# Patient Record
Sex: MALE | Race: WHITE
[De-identification: names, ages, dates, MRNs, and addresses within clinical notes are randomized per-mention and may not be internally consistent; named-entity substitution may affect disease eponyms.]

---

## 2017-01-03 ENCOUNTER — HOSPITAL ENCOUNTER (OUTPATIENT)
Dept: HOSPITAL 61 - PCVCCLINIC | Age: 72
End: 2017-01-03
Attending: INTERNAL MEDICINE
Payer: MEDICARE

## 2017-01-03 DIAGNOSIS — I48.91: Primary | ICD-10-CM

## 2017-01-03 PROCEDURE — 85610 PROTHROMBIN TIME: CPT

## 2017-01-26 ENCOUNTER — HOSPITAL ENCOUNTER (OUTPATIENT)
Dept: HOSPITAL 61 - PCVCCLINIC | Age: 72
Discharge: HOME | End: 2017-01-26
Attending: INTERNAL MEDICINE
Payer: MEDICARE

## 2017-01-26 DIAGNOSIS — I48.91: Primary | ICD-10-CM

## 2017-01-26 PROCEDURE — 85610 PROTHROMBIN TIME: CPT

## 2017-02-15 ENCOUNTER — HOSPITAL ENCOUNTER (OUTPATIENT)
Dept: HOSPITAL 61 - PCVCCLINIC | Age: 72
Discharge: HOME | End: 2017-02-15
Attending: INTERNAL MEDICINE
Payer: MEDICARE

## 2017-02-15 DIAGNOSIS — I48.91: Primary | ICD-10-CM

## 2017-02-15 PROCEDURE — 85610 PROTHROMBIN TIME: CPT

## 2017-03-01 ENCOUNTER — HOSPITAL ENCOUNTER (OUTPATIENT)
Dept: HOSPITAL 61 - PCVCCLINIC | Age: 72
Discharge: HOME | End: 2017-03-01
Attending: INTERNAL MEDICINE
Payer: MEDICARE

## 2017-03-01 DIAGNOSIS — I48.91: Primary | ICD-10-CM

## 2017-03-01 PROCEDURE — 85610 PROTHROMBIN TIME: CPT

## 2017-03-15 ENCOUNTER — HOSPITAL ENCOUNTER (OUTPATIENT)
Dept: HOSPITAL 61 - PCVCCLINIC | Age: 72
Discharge: HOME | End: 2017-03-15
Attending: INTERNAL MEDICINE
Payer: MEDICARE

## 2017-03-15 DIAGNOSIS — I48.91: Primary | ICD-10-CM

## 2017-03-15 PROCEDURE — G0463 HOSPITAL OUTPT CLINIC VISIT: HCPCS

## 2017-03-15 PROCEDURE — 85610 PROTHROMBIN TIME: CPT

## 2017-03-29 ENCOUNTER — HOSPITAL ENCOUNTER (OUTPATIENT)
Dept: HOSPITAL 61 - PCVCCLINIC | Age: 72
Discharge: HOME | End: 2017-03-29
Attending: INTERNAL MEDICINE
Payer: MEDICARE

## 2017-03-29 DIAGNOSIS — I48.91: Primary | ICD-10-CM

## 2017-03-29 DIAGNOSIS — I10: ICD-10-CM

## 2017-03-29 DIAGNOSIS — R00.1: ICD-10-CM

## 2017-03-29 DIAGNOSIS — E78.5: ICD-10-CM

## 2017-03-29 PROCEDURE — 85610 PROTHROMBIN TIME: CPT

## 2017-03-29 PROCEDURE — G0463 HOSPITAL OUTPT CLINIC VISIT: HCPCS

## 2017-04-05 ENCOUNTER — HOSPITAL ENCOUNTER (OUTPATIENT)
Dept: HOSPITAL 61 - PCVCCLINIC | Age: 72
Discharge: HOME | End: 2017-04-05
Attending: INTERNAL MEDICINE
Payer: MEDICARE

## 2017-04-05 DIAGNOSIS — I48.91: Primary | ICD-10-CM

## 2017-04-05 PROCEDURE — 85610 PROTHROMBIN TIME: CPT

## 2017-04-05 PROCEDURE — G0463 HOSPITAL OUTPT CLINIC VISIT: HCPCS

## 2017-04-12 ENCOUNTER — HOSPITAL ENCOUNTER (OUTPATIENT)
Dept: HOSPITAL 61 - PCVCCLINIC | Age: 72
Discharge: HOME | End: 2017-04-12
Attending: INTERNAL MEDICINE
Payer: MEDICARE

## 2017-04-12 DIAGNOSIS — I48.91: Primary | ICD-10-CM

## 2017-04-12 PROCEDURE — G0463 HOSPITAL OUTPT CLINIC VISIT: HCPCS

## 2017-04-12 PROCEDURE — 85610 PROTHROMBIN TIME: CPT

## 2017-04-26 ENCOUNTER — HOSPITAL ENCOUNTER (OUTPATIENT)
Dept: HOSPITAL 61 - PCVCCLINIC | Age: 72
Discharge: HOME | End: 2017-04-26
Attending: INTERNAL MEDICINE
Payer: MEDICARE

## 2017-04-26 DIAGNOSIS — I48.91: Primary | ICD-10-CM

## 2017-04-26 PROCEDURE — 85610 PROTHROMBIN TIME: CPT

## 2017-04-26 PROCEDURE — G0463 HOSPITAL OUTPT CLINIC VISIT: HCPCS

## 2017-05-25 ENCOUNTER — HOSPITAL ENCOUNTER (OUTPATIENT)
Dept: HOSPITAL 61 - PCVCCLINIC | Age: 72
Discharge: HOME | End: 2017-05-25
Attending: INTERNAL MEDICINE
Payer: MEDICARE

## 2017-05-25 DIAGNOSIS — Z79.01: ICD-10-CM

## 2017-05-25 DIAGNOSIS — I48.91: Primary | ICD-10-CM

## 2017-05-25 PROCEDURE — 85610 PROTHROMBIN TIME: CPT

## 2017-06-22 ENCOUNTER — HOSPITAL ENCOUNTER (OUTPATIENT)
Dept: HOSPITAL 61 - PCVCCLINIC | Age: 72
Discharge: HOME | End: 2017-06-22
Attending: INTERNAL MEDICINE
Payer: MEDICARE

## 2017-06-22 DIAGNOSIS — I48.91: Primary | ICD-10-CM

## 2017-06-22 DIAGNOSIS — Z95.0: ICD-10-CM

## 2017-06-22 DIAGNOSIS — E11.9: ICD-10-CM

## 2017-06-22 DIAGNOSIS — E78.00: ICD-10-CM

## 2017-06-22 PROCEDURE — 85610 PROTHROMBIN TIME: CPT

## 2017-07-07 ENCOUNTER — HOSPITAL ENCOUNTER (OUTPATIENT)
Dept: HOSPITAL 61 - PCVCCLINIC | Age: 72
Discharge: HOME | End: 2017-07-07
Attending: INTERNAL MEDICINE
Payer: MEDICARE

## 2017-07-07 DIAGNOSIS — E78.5: ICD-10-CM

## 2017-07-07 DIAGNOSIS — E78.00: ICD-10-CM

## 2017-07-07 DIAGNOSIS — I10: ICD-10-CM

## 2017-07-07 DIAGNOSIS — Z79.01: ICD-10-CM

## 2017-07-07 DIAGNOSIS — I48.91: Primary | ICD-10-CM

## 2017-07-07 DIAGNOSIS — E11.9: ICD-10-CM

## 2017-07-07 DIAGNOSIS — Z95.0: ICD-10-CM

## 2017-07-07 PROCEDURE — G0463 HOSPITAL OUTPT CLINIC VISIT: HCPCS

## 2017-07-07 PROCEDURE — 85610 PROTHROMBIN TIME: CPT

## 2017-07-07 PROCEDURE — 93005 ELECTROCARDIOGRAM TRACING: CPT

## 2017-08-07 ENCOUNTER — HOSPITAL ENCOUNTER (OUTPATIENT)
Dept: HOSPITAL 61 - PCVCCLINIC | Age: 72
Discharge: HOME | End: 2017-08-07
Attending: INTERNAL MEDICINE
Payer: MEDICARE

## 2017-08-07 DIAGNOSIS — I10: ICD-10-CM

## 2017-08-07 DIAGNOSIS — E78.5: ICD-10-CM

## 2017-08-07 DIAGNOSIS — I48.91: ICD-10-CM

## 2017-08-07 DIAGNOSIS — Z51.81: Primary | ICD-10-CM

## 2017-08-07 DIAGNOSIS — E78.00: ICD-10-CM

## 2017-08-07 DIAGNOSIS — E11.9: ICD-10-CM

## 2017-08-07 DIAGNOSIS — Z79.01: ICD-10-CM

## 2017-08-07 DIAGNOSIS — Z95.0: ICD-10-CM

## 2017-08-07 PROCEDURE — 85610 PROTHROMBIN TIME: CPT

## 2017-08-09 ENCOUNTER — HOSPITAL ENCOUNTER (OUTPATIENT)
Dept: HOSPITAL 35 - RAD | Age: 72
End: 2017-08-09
Attending: NURSE PRACTITIONER
Payer: COMMERCIAL

## 2017-08-09 DIAGNOSIS — R05: ICD-10-CM

## 2017-08-09 DIAGNOSIS — R06.02: Primary | ICD-10-CM

## 2017-08-10 ENCOUNTER — HOSPITAL ENCOUNTER (INPATIENT)
Dept: HOSPITAL 35 - ER | Age: 72
LOS: 3 days | Discharge: HOME | DRG: 871 | End: 2017-08-13
Attending: INTERNAL MEDICINE | Admitting: INTERNAL MEDICINE
Payer: COMMERCIAL

## 2017-08-10 VITALS — DIASTOLIC BLOOD PRESSURE: 88 MMHG | SYSTOLIC BLOOD PRESSURE: 166 MMHG

## 2017-08-10 VITALS — SYSTOLIC BLOOD PRESSURE: 161 MMHG | DIASTOLIC BLOOD PRESSURE: 93 MMHG

## 2017-08-10 VITALS — SYSTOLIC BLOOD PRESSURE: 153 MMHG | DIASTOLIC BLOOD PRESSURE: 100 MMHG

## 2017-08-10 VITALS — DIASTOLIC BLOOD PRESSURE: 74 MMHG | SYSTOLIC BLOOD PRESSURE: 148 MMHG

## 2017-08-10 VITALS — DIASTOLIC BLOOD PRESSURE: 86 MMHG | SYSTOLIC BLOOD PRESSURE: 146 MMHG

## 2017-08-10 VITALS — SYSTOLIC BLOOD PRESSURE: 179 MMHG | DIASTOLIC BLOOD PRESSURE: 103 MMHG

## 2017-08-10 VITALS — BODY MASS INDEX: 31.7 KG/M2 | HEIGHT: 69.02 IN | WEIGHT: 214 LBS

## 2017-08-10 DIAGNOSIS — E78.5: ICD-10-CM

## 2017-08-10 DIAGNOSIS — I48.2: ICD-10-CM

## 2017-08-10 DIAGNOSIS — E46: ICD-10-CM

## 2017-08-10 DIAGNOSIS — J44.0: ICD-10-CM

## 2017-08-10 DIAGNOSIS — E11.42: ICD-10-CM

## 2017-08-10 DIAGNOSIS — J44.1: ICD-10-CM

## 2017-08-10 DIAGNOSIS — I10: ICD-10-CM

## 2017-08-10 DIAGNOSIS — Z87.891: ICD-10-CM

## 2017-08-10 DIAGNOSIS — J96.01: ICD-10-CM

## 2017-08-10 DIAGNOSIS — Z90.49: ICD-10-CM

## 2017-08-10 DIAGNOSIS — Z95.0: ICD-10-CM

## 2017-08-10 DIAGNOSIS — Z82.49: ICD-10-CM

## 2017-08-10 DIAGNOSIS — D64.9: ICD-10-CM

## 2017-08-10 DIAGNOSIS — E83.42: ICD-10-CM

## 2017-08-10 DIAGNOSIS — Z88.8: ICD-10-CM

## 2017-08-10 DIAGNOSIS — J18.9: ICD-10-CM

## 2017-08-10 DIAGNOSIS — A41.9: Primary | ICD-10-CM

## 2017-08-10 LAB
ALBUMIN SERPL-MCNC: 2.9 G/DL (ref 3.4–5)
ALP SERPL-CCNC: 59 U/L (ref 46–116)
ALT SERPL-CCNC: 27 U/L (ref 30–65)
ANION GAP SERPL CALC-SCNC: 10 MMOL/L (ref 7–16)
APTT BLD: 48.8 SECONDS (ref 24.5–32.8)
AST SERPL-CCNC: 20 U/L (ref 15–37)
BASOPHILS NFR BLD AUTO: 0 % (ref 0–2)
BILIRUB SERPL-MCNC: 0.6 MG/DL
BUN SERPL-MCNC: 17 MG/DL (ref 7–18)
CALCIUM SERPL-MCNC: 9.6 MG/DL (ref 8.5–10.1)
CHLORIDE SERPL-SCNC: 101 MMOL/L (ref 98–107)
CK-MB MASS: < 0.5 NG/ML
CO2 SERPL-SCNC: 27 MMOL/L (ref 21–32)
CREAT SERPL-MCNC: 0.7 MG/DL (ref 0.7–1.3)
EOSINOPHIL NFR BLD: 0 % (ref 0–3)
ERYTHROCYTE [DISTWIDTH] IN BLOOD BY AUTOMATED COUNT: 13.5 % (ref 10.5–14.5)
GLUCOSE SERPL-MCNC: 171 MG/DL (ref 74–106)
GRANULOCYTES NFR BLD MANUAL: 74 % (ref 36–66)
HCT VFR BLD CALC: 41.4 % (ref 42–52)
HGB BLD-MCNC: 14 GM/DL (ref 14–18)
INR PPP: 2.8
LYMPHOCYTES NFR BLD AUTO: 10 % (ref 24–44)
MAGNESIUM SERPL-MCNC: 1.4 MG/DL (ref 1.8–2.4)
MANUAL DIFFERENTIAL PERFORMED BLD QL: YES
MCH RBC QN AUTO: 31.6 PG (ref 26–34)
MCHC RBC AUTO-ENTMCNC: 33.9 G/DL (ref 28–37)
MCV RBC: 93.3 FL (ref 80–100)
MONOCYTES NFR BLD: 11 % (ref 1–8)
NEUTROPHILS # BLD: 14 THOU/UL (ref 1.4–8.2)
NEUTS BAND NFR BLD: 4 % (ref 0–8)
NT-PRO BRAIN NAT PEPTIDE: 626 PG/ML (ref ?–300)
PLATELET # BLD: 239 THOU/UL (ref 150–400)
POTASSIUM SERPL-SCNC: 3.6 MMOL/L (ref 3.5–5.1)
PROT SERPL-MCNC: 7.8 G/DL (ref 6.4–8.2)
PROTHROMBIN TIME: 27.8 SECONDS (ref 9.3–11.4)
RBC # BLD AUTO: 4.43 MIL/UL (ref 4.5–6)
RBC MORPH BLD: NORMAL
SODIUM SERPL-SCNC: 138 MMOL/L (ref 136–145)
TOTAL CELL COUNT: 100
TROPONIN I SERPL-MCNC: < 0.04 NG/ML
VARIANT LYMPHS NFR BLD MANUAL: 1 %
WBC # BLD AUTO: 18 THOU/UL (ref 4–11)

## 2017-08-10 PROCEDURE — 10183: CPT

## 2017-08-11 VITALS — SYSTOLIC BLOOD PRESSURE: 179 MMHG | DIASTOLIC BLOOD PRESSURE: 123 MMHG

## 2017-08-11 VITALS — DIASTOLIC BLOOD PRESSURE: 74 MMHG | SYSTOLIC BLOOD PRESSURE: 145 MMHG

## 2017-08-11 VITALS — DIASTOLIC BLOOD PRESSURE: 84 MMHG | SYSTOLIC BLOOD PRESSURE: 146 MMHG

## 2017-08-11 VITALS — DIASTOLIC BLOOD PRESSURE: 64 MMHG | SYSTOLIC BLOOD PRESSURE: 131 MMHG

## 2017-08-11 LAB
ANION GAP SERPL CALC-SCNC: 11 MMOL/L (ref 7–16)
ANISOCYTOSIS BLD QL SMEAR: SLIGHT
BASOPHILS NFR BLD AUTO: 0 % (ref 0–2)
BUN SERPL-MCNC: 17 MG/DL (ref 7–18)
CALCIUM SERPL-MCNC: 9.2 MG/DL (ref 8.5–10.1)
CHLORIDE SERPL-SCNC: 102 MMOL/L (ref 98–107)
CO2 SERPL-SCNC: 25 MMOL/L (ref 21–32)
CREAT SERPL-MCNC: 0.7 MG/DL (ref 0.7–1.3)
EOSINOPHIL NFR BLD: 0 % (ref 0–3)
ERYTHROCYTE [DISTWIDTH] IN BLOOD BY AUTOMATED COUNT: 13.5 % (ref 10.5–14.5)
GLUCOSE SERPL-MCNC: 264 MG/DL (ref 74–106)
GRANULOCYTES NFR BLD MANUAL: 89 % (ref 36–66)
HCT VFR BLD CALC: 38.9 % (ref 42–52)
HGB BLD-MCNC: 13.3 GM/DL (ref 14–18)
INR PPP: 3.3
LYMPHOCYTES NFR BLD AUTO: 7 % (ref 24–44)
MANUAL DIFFERENTIAL PERFORMED BLD QL: YES
MCH RBC QN AUTO: 31.6 PG (ref 26–34)
MCHC RBC AUTO-ENTMCNC: 34.3 G/DL (ref 28–37)
MCV RBC: 92.2 FL (ref 80–100)
MONOCYTES NFR BLD: 4 % (ref 1–8)
NEUTROPHILS # BLD: 14.3 THOU/UL (ref 1.4–8.2)
NEUTS BAND NFR BLD: 0 % (ref 0–8)
PLATELET # BLD: 253 THOU/UL (ref 150–400)
POTASSIUM SERPL-SCNC: 3.7 MMOL/L (ref 3.5–5.1)
PROTHROMBIN TIME: 33 SECONDS (ref 9.3–11.4)
RBC # BLD AUTO: 4.22 MIL/UL (ref 4.5–6)
SODIUM SERPL-SCNC: 138 MMOL/L (ref 136–145)
TOTAL CELL COUNT: 100
WBC # BLD AUTO: 16.1 THOU/UL (ref 4–11)

## 2017-08-12 VITALS — DIASTOLIC BLOOD PRESSURE: 78 MMHG | SYSTOLIC BLOOD PRESSURE: 145 MMHG

## 2017-08-12 VITALS — DIASTOLIC BLOOD PRESSURE: 88 MMHG | SYSTOLIC BLOOD PRESSURE: 151 MMHG

## 2017-08-12 VITALS — DIASTOLIC BLOOD PRESSURE: 84 MMHG | SYSTOLIC BLOOD PRESSURE: 158 MMHG

## 2017-08-12 VITALS — SYSTOLIC BLOOD PRESSURE: 158 MMHG | DIASTOLIC BLOOD PRESSURE: 85 MMHG

## 2017-08-12 LAB
ANION GAP SERPL CALC-SCNC: 9 MMOL/L (ref 7–16)
BUN SERPL-MCNC: 18 MG/DL (ref 7–18)
CALCIUM SERPL-MCNC: 8.9 MG/DL (ref 8.5–10.1)
CHLORIDE SERPL-SCNC: 102 MMOL/L (ref 98–107)
CO2 SERPL-SCNC: 27 MMOL/L (ref 21–32)
CREAT SERPL-MCNC: 0.7 MG/DL (ref 0.7–1.3)
ERYTHROCYTE [DISTWIDTH] IN BLOOD BY AUTOMATED COUNT: 13.6 % (ref 10.5–14.5)
GLUCOSE SERPL-MCNC: 188 MG/DL (ref 74–106)
GRANULOCYTES NFR BLD MANUAL: 74 % (ref 36–66)
HCT VFR BLD CALC: 38.8 % (ref 42–52)
HGB BLD-MCNC: 13.3 GM/DL (ref 14–18)
INR PPP: 4.6
LYMPHOCYTES NFR BLD AUTO: 13 % (ref 24–44)
MANUAL DIFFERENTIAL PERFORMED BLD QL: YES
MCH RBC QN AUTO: 31.5 PG (ref 26–34)
MCHC RBC AUTO-ENTMCNC: 34.3 G/DL (ref 28–37)
MCV RBC: 92 FL (ref 80–100)
MONOCYTES NFR BLD: 10 % (ref 1–8)
NEUTROPHILS # BLD: 15 THOU/UL (ref 1.4–8.2)
NEUTS BAND NFR BLD: 2 % (ref 0–8)
PLATELET # BLD: 274 THOU/UL (ref 150–400)
POTASSIUM SERPL-SCNC: 3.7 MMOL/L (ref 3.5–5.1)
PROTHROMBIN TIME: 46 SECONDS (ref 9.3–11.4)
RBC # BLD AUTO: 4.22 MIL/UL (ref 4.5–6)
RBC MORPH BLD: NORMAL
SODIUM SERPL-SCNC: 138 MMOL/L (ref 136–145)
TOTAL CELL COUNT: 100
VARIANT LYMPHS NFR BLD MANUAL: 1 %
WBC # BLD AUTO: 19.7 THOU/UL (ref 4–11)

## 2017-08-13 VITALS — SYSTOLIC BLOOD PRESSURE: 127 MMHG | DIASTOLIC BLOOD PRESSURE: 87 MMHG

## 2017-08-13 VITALS — SYSTOLIC BLOOD PRESSURE: 149 MMHG | DIASTOLIC BLOOD PRESSURE: 100 MMHG

## 2017-08-13 VITALS — DIASTOLIC BLOOD PRESSURE: 100 MMHG | SYSTOLIC BLOOD PRESSURE: 149 MMHG

## 2017-08-13 LAB
ALBUMIN SERPL-MCNC: 2.7 G/DL (ref 3.4–5)
ALP SERPL-CCNC: 53 U/L (ref 46–116)
ALT SERPL-CCNC: 45 U/L (ref 30–65)
ANION GAP SERPL CALC-SCNC: 8 MMOL/L (ref 7–16)
AST SERPL-CCNC: 26 U/L (ref 15–37)
BILIRUB SERPL-MCNC: 0.3 MG/DL
BUN SERPL-MCNC: 16 MG/DL (ref 7–18)
CALCIUM SERPL-MCNC: 9 MG/DL (ref 8.5–10.1)
CHLORIDE SERPL-SCNC: 99 MMOL/L (ref 98–107)
CO2 SERPL-SCNC: 31 MMOL/L (ref 21–32)
CREAT SERPL-MCNC: 0.6 MG/DL (ref 0.7–1.3)
ERYTHROCYTE [DISTWIDTH] IN BLOOD BY AUTOMATED COUNT: 13.2 % (ref 10.5–14.5)
GLUCOSE SERPL-MCNC: 137 MG/DL (ref 74–106)
HCT VFR BLD CALC: 41.9 % (ref 42–52)
HGB BLD-MCNC: 14.4 GM/DL (ref 14–18)
INR PPP: 3.2
MCH RBC QN AUTO: 31.7 PG (ref 26–34)
MCHC RBC AUTO-ENTMCNC: 34.3 G/DL (ref 28–37)
MCV RBC: 92.5 FL (ref 80–100)
PLATELET # BLD: 305 THOU/UL (ref 150–400)
POTASSIUM SERPL-SCNC: 3.7 MMOL/L (ref 3.5–5.1)
PROT SERPL-MCNC: 7.2 G/DL (ref 6.4–8.2)
PROTHROMBIN TIME: 31.8 SECONDS (ref 9.3–11.4)
RBC # BLD AUTO: 4.53 MIL/UL (ref 4.5–6)
SODIUM SERPL-SCNC: 138 MMOL/L (ref 136–145)
WBC # BLD AUTO: 13.5 THOU/UL (ref 4–11)

## 2017-08-14 ENCOUNTER — HOSPITAL ENCOUNTER (OUTPATIENT)
Dept: HOSPITAL 61 - PCVCCLINIC | Age: 72
Discharge: HOME | End: 2017-08-14
Attending: INTERNAL MEDICINE
Payer: MEDICARE

## 2017-08-14 DIAGNOSIS — E78.5: ICD-10-CM

## 2017-08-14 DIAGNOSIS — E11.9: ICD-10-CM

## 2017-08-14 DIAGNOSIS — Z95.0: ICD-10-CM

## 2017-08-14 DIAGNOSIS — E78.00: ICD-10-CM

## 2017-08-14 DIAGNOSIS — Z79.01: ICD-10-CM

## 2017-08-14 DIAGNOSIS — I48.91: ICD-10-CM

## 2017-08-14 DIAGNOSIS — Z51.81: Primary | ICD-10-CM

## 2017-08-14 DIAGNOSIS — I10: ICD-10-CM

## 2017-08-14 PROCEDURE — 85610 PROTHROMBIN TIME: CPT

## 2017-08-16 ENCOUNTER — HOSPITAL ENCOUNTER (OUTPATIENT)
Dept: HOSPITAL 61 - PCVCCLINIC | Age: 72
Discharge: HOME | End: 2017-08-16
Attending: INTERNAL MEDICINE
Payer: MEDICARE

## 2017-08-16 DIAGNOSIS — E78.00: ICD-10-CM

## 2017-08-16 DIAGNOSIS — Z51.81: Primary | ICD-10-CM

## 2017-08-16 DIAGNOSIS — Z79.01: ICD-10-CM

## 2017-08-16 DIAGNOSIS — I48.91: ICD-10-CM

## 2017-08-16 DIAGNOSIS — I10: ICD-10-CM

## 2017-08-16 DIAGNOSIS — E11.9: ICD-10-CM

## 2017-08-16 DIAGNOSIS — Z95.0: ICD-10-CM

## 2017-08-16 PROCEDURE — 85610 PROTHROMBIN TIME: CPT

## 2017-08-22 ENCOUNTER — HOSPITAL ENCOUNTER (OUTPATIENT)
Dept: HOSPITAL 61 - PCVCCLINIC | Age: 72
Discharge: HOME | End: 2017-08-22
Attending: INTERNAL MEDICINE
Payer: MEDICARE

## 2017-08-22 DIAGNOSIS — E78.00: ICD-10-CM

## 2017-08-22 DIAGNOSIS — I48.91: ICD-10-CM

## 2017-08-22 DIAGNOSIS — E11.9: ICD-10-CM

## 2017-08-22 DIAGNOSIS — Z95.0: ICD-10-CM

## 2017-08-22 DIAGNOSIS — I10: ICD-10-CM

## 2017-08-22 DIAGNOSIS — Z51.81: Primary | ICD-10-CM

## 2017-08-22 DIAGNOSIS — Z79.01: ICD-10-CM

## 2017-08-22 PROCEDURE — 85610 PROTHROMBIN TIME: CPT

## 2017-08-29 ENCOUNTER — HOSPITAL ENCOUNTER (OUTPATIENT)
Dept: HOSPITAL 61 - PCVCCLINIC | Age: 72
Discharge: HOME | End: 2017-08-29
Attending: INTERNAL MEDICINE
Payer: MEDICARE

## 2017-08-29 DIAGNOSIS — Z95.0: ICD-10-CM

## 2017-08-29 DIAGNOSIS — I48.91: Primary | ICD-10-CM

## 2017-08-29 DIAGNOSIS — E11.9: ICD-10-CM

## 2017-08-29 DIAGNOSIS — E78.5: ICD-10-CM

## 2017-08-29 DIAGNOSIS — Z79.01: ICD-10-CM

## 2017-08-29 PROCEDURE — 85610 PROTHROMBIN TIME: CPT

## 2017-09-05 ENCOUNTER — HOSPITAL ENCOUNTER (OUTPATIENT)
Dept: HOSPITAL 61 - PCVCCLINIC | Age: 72
Discharge: HOME | End: 2017-09-05
Attending: INTERNAL MEDICINE
Payer: MEDICARE

## 2017-09-05 DIAGNOSIS — Z95.0: ICD-10-CM

## 2017-09-05 DIAGNOSIS — Z79.01: ICD-10-CM

## 2017-09-05 DIAGNOSIS — Z51.81: Primary | ICD-10-CM

## 2017-09-05 DIAGNOSIS — I48.91: ICD-10-CM

## 2017-09-05 DIAGNOSIS — E78.00: ICD-10-CM

## 2017-09-05 DIAGNOSIS — E11.9: ICD-10-CM

## 2017-09-05 DIAGNOSIS — I10: ICD-10-CM

## 2017-09-05 PROCEDURE — 85610 PROTHROMBIN TIME: CPT

## 2017-09-19 ENCOUNTER — HOSPITAL ENCOUNTER (OUTPATIENT)
Dept: HOSPITAL 61 - PCVCCLINIC | Age: 72
Discharge: HOME | End: 2017-09-19
Attending: INTERNAL MEDICINE
Payer: MEDICARE

## 2017-09-19 DIAGNOSIS — I10: ICD-10-CM

## 2017-09-19 DIAGNOSIS — Z79.01: ICD-10-CM

## 2017-09-19 DIAGNOSIS — Z51.81: Primary | ICD-10-CM

## 2017-09-19 DIAGNOSIS — E11.9: ICD-10-CM

## 2017-09-19 DIAGNOSIS — I48.91: ICD-10-CM

## 2017-09-19 DIAGNOSIS — E78.00: ICD-10-CM

## 2017-09-19 DIAGNOSIS — Z95.0: ICD-10-CM

## 2017-09-19 PROCEDURE — 85610 PROTHROMBIN TIME: CPT

## 2017-09-26 ENCOUNTER — HOSPITAL ENCOUNTER (OUTPATIENT)
Dept: HOSPITAL 61 - PCVCCLINIC | Age: 72
Discharge: HOME | End: 2017-09-26
Attending: INTERNAL MEDICINE
Payer: MEDICARE

## 2017-09-26 DIAGNOSIS — I10: ICD-10-CM

## 2017-09-26 DIAGNOSIS — Z95.0: ICD-10-CM

## 2017-09-26 DIAGNOSIS — E11.9: ICD-10-CM

## 2017-09-26 DIAGNOSIS — I48.91: ICD-10-CM

## 2017-09-26 DIAGNOSIS — Z79.01: ICD-10-CM

## 2017-09-26 DIAGNOSIS — E78.00: ICD-10-CM

## 2017-09-26 DIAGNOSIS — Z51.81: Primary | ICD-10-CM

## 2017-09-26 PROCEDURE — 85610 PROTHROMBIN TIME: CPT

## 2017-10-04 ENCOUNTER — HOSPITAL ENCOUNTER (OUTPATIENT)
Dept: HOSPITAL 61 - PCVCCLINIC | Age: 72
Discharge: HOME | End: 2017-10-04
Attending: INTERNAL MEDICINE
Payer: MEDICARE

## 2017-10-04 DIAGNOSIS — E78.00: ICD-10-CM

## 2017-10-04 DIAGNOSIS — I10: ICD-10-CM

## 2017-10-04 DIAGNOSIS — E11.9: ICD-10-CM

## 2017-10-04 DIAGNOSIS — Z79.01: ICD-10-CM

## 2017-10-04 DIAGNOSIS — Z51.81: Primary | ICD-10-CM

## 2017-10-04 DIAGNOSIS — I48.91: ICD-10-CM

## 2017-10-04 DIAGNOSIS — Z95.0: ICD-10-CM

## 2017-10-04 PROCEDURE — 85610 PROTHROMBIN TIME: CPT

## 2017-10-18 ENCOUNTER — HOSPITAL ENCOUNTER (OUTPATIENT)
Dept: HOSPITAL 61 - PCVCCLINIC | Age: 72
Discharge: HOME | End: 2017-10-18
Attending: INTERNAL MEDICINE
Payer: MEDICARE

## 2017-10-18 DIAGNOSIS — Z79.01: ICD-10-CM

## 2017-10-18 DIAGNOSIS — I10: ICD-10-CM

## 2017-10-18 DIAGNOSIS — E11.9: ICD-10-CM

## 2017-10-18 DIAGNOSIS — I48.91: ICD-10-CM

## 2017-10-18 DIAGNOSIS — Z95.0: ICD-10-CM

## 2017-10-18 DIAGNOSIS — Z51.81: Primary | ICD-10-CM

## 2017-10-18 DIAGNOSIS — E78.00: ICD-10-CM

## 2017-10-18 PROCEDURE — 85610 PROTHROMBIN TIME: CPT

## 2017-11-15 ENCOUNTER — HOSPITAL ENCOUNTER (OUTPATIENT)
Dept: HOSPITAL 61 - PCVCCLINIC | Age: 72
Discharge: HOME | End: 2017-11-15
Attending: INTERNAL MEDICINE
Payer: MEDICARE

## 2017-11-15 DIAGNOSIS — I48.91: ICD-10-CM

## 2017-11-15 DIAGNOSIS — Z51.81: Primary | ICD-10-CM

## 2017-11-15 DIAGNOSIS — E78.00: ICD-10-CM

## 2017-11-15 DIAGNOSIS — Z79.01: ICD-10-CM

## 2017-11-15 DIAGNOSIS — I10: ICD-10-CM

## 2017-11-15 DIAGNOSIS — Z95.0: ICD-10-CM

## 2017-11-15 DIAGNOSIS — E11.9: ICD-10-CM

## 2017-11-15 PROCEDURE — 85610 PROTHROMBIN TIME: CPT

## 2017-12-13 ENCOUNTER — HOSPITAL ENCOUNTER (OUTPATIENT)
Dept: HOSPITAL 61 - PCVCCLINIC | Age: 72
Discharge: HOME | End: 2017-12-13
Attending: INTERNAL MEDICINE
Payer: MEDICARE

## 2017-12-13 DIAGNOSIS — I10: ICD-10-CM

## 2017-12-13 DIAGNOSIS — Z51.81: Primary | ICD-10-CM

## 2017-12-13 DIAGNOSIS — I48.91: ICD-10-CM

## 2017-12-13 DIAGNOSIS — Z95.0: ICD-10-CM

## 2017-12-13 DIAGNOSIS — E78.00: ICD-10-CM

## 2017-12-13 DIAGNOSIS — E11.9: ICD-10-CM

## 2017-12-13 PROCEDURE — 85610 PROTHROMBIN TIME: CPT

## 2018-02-07 ENCOUNTER — HOSPITAL ENCOUNTER (OUTPATIENT)
Dept: HOSPITAL 61 - PCVCCLINIC | Age: 73
Discharge: HOME | End: 2018-02-07
Attending: INTERNAL MEDICINE
Payer: MEDICARE

## 2018-02-07 DIAGNOSIS — E78.00: ICD-10-CM

## 2018-02-07 DIAGNOSIS — I10: ICD-10-CM

## 2018-02-07 DIAGNOSIS — Z51.81: Primary | ICD-10-CM

## 2018-02-07 DIAGNOSIS — Z95.0: ICD-10-CM

## 2018-02-07 DIAGNOSIS — E11.9: ICD-10-CM

## 2018-02-07 DIAGNOSIS — Z79.01: ICD-10-CM

## 2018-02-07 DIAGNOSIS — I48.91: ICD-10-CM

## 2018-02-07 PROCEDURE — 85610 PROTHROMBIN TIME: CPT

## 2018-03-07 ENCOUNTER — HOSPITAL ENCOUNTER (OUTPATIENT)
Dept: HOSPITAL 61 - PCVCCLINIC | Age: 73
Discharge: HOME | End: 2018-03-07
Attending: INTERNAL MEDICINE
Payer: MEDICARE

## 2018-03-07 DIAGNOSIS — I10: ICD-10-CM

## 2018-03-07 DIAGNOSIS — Z51.81: Primary | ICD-10-CM

## 2018-03-07 DIAGNOSIS — Z79.01: ICD-10-CM

## 2018-03-07 DIAGNOSIS — Z95.0: ICD-10-CM

## 2018-03-07 DIAGNOSIS — E11.9: ICD-10-CM

## 2018-03-07 DIAGNOSIS — I48.91: ICD-10-CM

## 2018-03-07 DIAGNOSIS — E78.5: ICD-10-CM

## 2018-03-07 DIAGNOSIS — E78.00: ICD-10-CM

## 2018-03-07 PROCEDURE — 85610 PROTHROMBIN TIME: CPT

## 2018-04-04 ENCOUNTER — HOSPITAL ENCOUNTER (OUTPATIENT)
Dept: HOSPITAL 61 - PCVCCLINIC | Age: 73
Discharge: HOME | End: 2018-04-04
Attending: INTERNAL MEDICINE
Payer: MEDICARE

## 2018-04-04 DIAGNOSIS — Z79.01: ICD-10-CM

## 2018-04-04 DIAGNOSIS — I10: ICD-10-CM

## 2018-04-04 DIAGNOSIS — E11.9: ICD-10-CM

## 2018-04-04 DIAGNOSIS — Z51.81: Primary | ICD-10-CM

## 2018-04-04 DIAGNOSIS — E78.00: ICD-10-CM

## 2018-04-04 DIAGNOSIS — E78.5: ICD-10-CM

## 2018-04-04 DIAGNOSIS — Z95.0: ICD-10-CM

## 2018-04-04 DIAGNOSIS — I48.91: ICD-10-CM

## 2018-04-04 PROCEDURE — 85610 PROTHROMBIN TIME: CPT

## 2018-04-23 ENCOUNTER — HOSPITAL ENCOUNTER (OUTPATIENT)
Dept: HOSPITAL 61 - PCVCCLINIC | Age: 73
Discharge: HOME | End: 2018-04-23
Attending: INTERNAL MEDICINE
Payer: MEDICARE

## 2018-04-23 DIAGNOSIS — E11.9: ICD-10-CM

## 2018-04-23 DIAGNOSIS — Z79.01: ICD-10-CM

## 2018-04-23 DIAGNOSIS — I48.91: ICD-10-CM

## 2018-04-23 DIAGNOSIS — Z95.0: ICD-10-CM

## 2018-04-23 DIAGNOSIS — Z51.81: Primary | ICD-10-CM

## 2018-04-23 DIAGNOSIS — I10: ICD-10-CM

## 2018-04-23 DIAGNOSIS — E78.5: ICD-10-CM

## 2018-04-23 DIAGNOSIS — E78.00: ICD-10-CM

## 2018-04-23 PROCEDURE — 85610 PROTHROMBIN TIME: CPT

## 2018-05-02 ENCOUNTER — HOSPITAL ENCOUNTER (OUTPATIENT)
Dept: HOSPITAL 61 - PCVCCLINIC | Age: 73
Discharge: HOME | End: 2018-05-02
Attending: INTERNAL MEDICINE
Payer: MEDICARE

## 2018-05-02 DIAGNOSIS — Z51.81: Primary | ICD-10-CM

## 2018-05-02 DIAGNOSIS — I10: ICD-10-CM

## 2018-05-02 DIAGNOSIS — E78.00: ICD-10-CM

## 2018-05-02 DIAGNOSIS — E11.9: ICD-10-CM

## 2018-05-02 DIAGNOSIS — Z95.0: ICD-10-CM

## 2018-05-02 DIAGNOSIS — E78.5: ICD-10-CM

## 2018-05-02 DIAGNOSIS — I48.91: ICD-10-CM

## 2018-05-02 DIAGNOSIS — Z79.01: ICD-10-CM

## 2018-05-02 PROCEDURE — 85610 PROTHROMBIN TIME: CPT

## 2018-05-09 ENCOUNTER — HOSPITAL ENCOUNTER (OUTPATIENT)
Dept: HOSPITAL 61 - PCVCCLINIC | Age: 73
Discharge: HOME | End: 2018-05-09
Attending: INTERNAL MEDICINE
Payer: MEDICARE

## 2018-05-09 DIAGNOSIS — Z95.0: ICD-10-CM

## 2018-05-09 DIAGNOSIS — Z51.81: Primary | ICD-10-CM

## 2018-05-09 DIAGNOSIS — E78.5: ICD-10-CM

## 2018-05-09 DIAGNOSIS — Z79.01: ICD-10-CM

## 2018-05-09 DIAGNOSIS — I10: ICD-10-CM

## 2018-05-09 DIAGNOSIS — E78.00: ICD-10-CM

## 2018-05-09 DIAGNOSIS — I48.91: ICD-10-CM

## 2018-05-09 DIAGNOSIS — E11.9: ICD-10-CM

## 2018-05-09 PROCEDURE — 85610 PROTHROMBIN TIME: CPT

## 2018-05-23 ENCOUNTER — HOSPITAL ENCOUNTER (OUTPATIENT)
Dept: HOSPITAL 61 - PCVCCLINIC | Age: 73
Discharge: HOME | End: 2018-05-23
Attending: INTERNAL MEDICINE
Payer: MEDICARE

## 2018-05-23 DIAGNOSIS — E78.5: ICD-10-CM

## 2018-05-23 DIAGNOSIS — E11.9: ICD-10-CM

## 2018-05-23 DIAGNOSIS — Z79.01: ICD-10-CM

## 2018-05-23 DIAGNOSIS — Z51.81: Primary | ICD-10-CM

## 2018-05-23 DIAGNOSIS — E78.00: ICD-10-CM

## 2018-05-23 DIAGNOSIS — I48.91: ICD-10-CM

## 2018-05-23 DIAGNOSIS — I10: ICD-10-CM

## 2018-05-23 DIAGNOSIS — Z95.0: ICD-10-CM

## 2018-05-23 DIAGNOSIS — Z88.8: ICD-10-CM

## 2018-05-23 PROCEDURE — 85610 PROTHROMBIN TIME: CPT

## 2018-06-05 ENCOUNTER — HOSPITAL ENCOUNTER (OUTPATIENT)
Dept: HOSPITAL 61 - PCVCCLINIC | Age: 73
Discharge: HOME | End: 2018-06-05
Attending: INTERNAL MEDICINE
Payer: MEDICARE

## 2018-06-05 DIAGNOSIS — E11.9: ICD-10-CM

## 2018-06-05 DIAGNOSIS — Z95.0: ICD-10-CM

## 2018-06-05 DIAGNOSIS — E78.5: ICD-10-CM

## 2018-06-05 DIAGNOSIS — I48.91: ICD-10-CM

## 2018-06-05 DIAGNOSIS — I10: ICD-10-CM

## 2018-06-05 DIAGNOSIS — E78.00: ICD-10-CM

## 2018-06-05 DIAGNOSIS — Z51.81: Primary | ICD-10-CM

## 2018-06-05 DIAGNOSIS — Z79.01: ICD-10-CM

## 2018-06-05 PROCEDURE — 85610 PROTHROMBIN TIME: CPT

## 2018-06-20 ENCOUNTER — HOSPITAL ENCOUNTER (OUTPATIENT)
Dept: HOSPITAL 61 - PCVCCLINIC | Age: 73
Discharge: HOME | End: 2018-06-20
Attending: INTERNAL MEDICINE
Payer: MEDICARE

## 2018-06-20 DIAGNOSIS — Z79.01: ICD-10-CM

## 2018-06-20 DIAGNOSIS — E78.5: ICD-10-CM

## 2018-06-20 DIAGNOSIS — Z51.81: Primary | ICD-10-CM

## 2018-06-20 DIAGNOSIS — Z95.0: ICD-10-CM

## 2018-06-20 DIAGNOSIS — I10: ICD-10-CM

## 2018-06-20 DIAGNOSIS — I48.91: ICD-10-CM

## 2018-06-20 DIAGNOSIS — E11.9: ICD-10-CM

## 2018-06-20 DIAGNOSIS — E78.00: ICD-10-CM

## 2018-06-20 PROCEDURE — 85610 PROTHROMBIN TIME: CPT

## 2018-07-24 ENCOUNTER — HOSPITAL ENCOUNTER (OUTPATIENT)
Dept: HOSPITAL 61 - PCVCIMAG | Age: 73
Discharge: HOME | End: 2018-07-24
Attending: INTERNAL MEDICINE
Payer: MEDICARE

## 2018-07-24 DIAGNOSIS — Z87.891: ICD-10-CM

## 2018-07-24 DIAGNOSIS — I25.10: ICD-10-CM

## 2018-07-24 DIAGNOSIS — R60.9: ICD-10-CM

## 2018-07-24 DIAGNOSIS — I10: ICD-10-CM

## 2018-07-24 DIAGNOSIS — E78.5: ICD-10-CM

## 2018-07-24 DIAGNOSIS — I48.91: Primary | ICD-10-CM

## 2018-07-24 DIAGNOSIS — E11.9: ICD-10-CM

## 2018-07-24 PROCEDURE — 93017 CV STRESS TEST TRACING ONLY: CPT

## 2018-07-24 PROCEDURE — 93005 ELECTROCARDIOGRAM TRACING: CPT

## 2018-07-24 PROCEDURE — 78452 HT MUSCLE IMAGE SPECT MULT: CPT

## 2018-07-24 PROCEDURE — 85610 PROTHROMBIN TIME: CPT

## 2018-08-03 ENCOUNTER — HOSPITAL ENCOUNTER (OUTPATIENT)
Dept: HOSPITAL 61 - PCVCCLINIC | Age: 73
Discharge: HOME | End: 2018-08-03
Attending: INTERNAL MEDICINE
Payer: MEDICARE

## 2018-08-03 DIAGNOSIS — Z48.812: Primary | ICD-10-CM

## 2018-08-03 DIAGNOSIS — Z95.0: ICD-10-CM

## 2018-08-03 DIAGNOSIS — R00.1: ICD-10-CM

## 2018-08-03 DIAGNOSIS — I48.91: ICD-10-CM

## 2018-08-03 PROCEDURE — 93280 PM DEVICE PROGR EVAL DUAL: CPT

## 2018-08-07 ENCOUNTER — HOSPITAL ENCOUNTER (OUTPATIENT)
Dept: HOSPITAL 61 - PCVCCLINIC | Age: 73
Discharge: HOME | End: 2018-08-07
Attending: INTERNAL MEDICINE
Payer: MEDICARE

## 2018-08-07 DIAGNOSIS — E78.00: ICD-10-CM

## 2018-08-07 DIAGNOSIS — R00.1: ICD-10-CM

## 2018-08-07 DIAGNOSIS — I48.91: Primary | ICD-10-CM

## 2018-08-07 DIAGNOSIS — I10: ICD-10-CM

## 2018-08-07 PROCEDURE — 93005 ELECTROCARDIOGRAM TRACING: CPT

## 2018-08-21 ENCOUNTER — HOSPITAL ENCOUNTER (OUTPATIENT)
Dept: HOSPITAL 61 - PCVCCLINIC | Age: 73
Discharge: HOME | End: 2018-08-21
Attending: INTERNAL MEDICINE
Payer: MEDICARE

## 2018-08-21 DIAGNOSIS — E11.9: ICD-10-CM

## 2018-08-21 DIAGNOSIS — Z51.81: Primary | ICD-10-CM

## 2018-08-21 DIAGNOSIS — I48.91: ICD-10-CM

## 2018-08-21 DIAGNOSIS — I10: ICD-10-CM

## 2018-08-21 DIAGNOSIS — E78.5: ICD-10-CM

## 2018-08-21 DIAGNOSIS — Z88.8: ICD-10-CM

## 2018-08-21 DIAGNOSIS — E78.00: ICD-10-CM

## 2018-08-21 DIAGNOSIS — Z95.0: ICD-10-CM

## 2018-08-21 PROCEDURE — 85610 PROTHROMBIN TIME: CPT

## 2018-08-24 ENCOUNTER — HOSPITAL ENCOUNTER (OUTPATIENT)
Dept: HOSPITAL 61 - PCVCCLINIC | Age: 73
Discharge: HOME | End: 2018-08-24
Attending: INTERNAL MEDICINE
Payer: MEDICARE

## 2018-08-24 DIAGNOSIS — Z95.0: ICD-10-CM

## 2018-08-24 DIAGNOSIS — I10: ICD-10-CM

## 2018-08-24 DIAGNOSIS — E11.9: ICD-10-CM

## 2018-08-24 DIAGNOSIS — I48.91: ICD-10-CM

## 2018-08-24 DIAGNOSIS — Z79.01: ICD-10-CM

## 2018-08-24 DIAGNOSIS — Z51.81: Primary | ICD-10-CM

## 2018-08-24 DIAGNOSIS — E78.5: ICD-10-CM

## 2018-08-24 DIAGNOSIS — Z88.8: ICD-10-CM

## 2018-08-24 DIAGNOSIS — E78.00: ICD-10-CM

## 2018-08-24 DIAGNOSIS — E66.01: ICD-10-CM

## 2018-08-24 PROCEDURE — 85610 PROTHROMBIN TIME: CPT

## 2018-09-07 ENCOUNTER — HOSPITAL ENCOUNTER (OUTPATIENT)
Dept: HOSPITAL 61 - PCVCCLINIC | Age: 73
Discharge: HOME | End: 2018-09-07
Attending: INTERNAL MEDICINE
Payer: MEDICARE

## 2018-09-07 DIAGNOSIS — E78.00: ICD-10-CM

## 2018-09-07 DIAGNOSIS — Z95.0: ICD-10-CM

## 2018-09-07 DIAGNOSIS — Z88.8: ICD-10-CM

## 2018-09-07 DIAGNOSIS — E66.09: ICD-10-CM

## 2018-09-07 DIAGNOSIS — E78.5: ICD-10-CM

## 2018-09-07 DIAGNOSIS — Z79.01: ICD-10-CM

## 2018-09-07 DIAGNOSIS — Z51.81: Primary | ICD-10-CM

## 2018-09-07 DIAGNOSIS — I48.91: ICD-10-CM

## 2018-09-07 DIAGNOSIS — E11.9: ICD-10-CM

## 2018-09-07 DIAGNOSIS — I10: ICD-10-CM

## 2018-09-07 PROCEDURE — 85610 PROTHROMBIN TIME: CPT

## 2018-09-14 ENCOUNTER — HOSPITAL ENCOUNTER (OUTPATIENT)
Dept: HOSPITAL 61 - PCVCCLINIC | Age: 73
Discharge: HOME | End: 2018-09-14
Payer: MEDICARE

## 2018-09-14 DIAGNOSIS — E78.00: ICD-10-CM

## 2018-09-14 DIAGNOSIS — Z95.0: ICD-10-CM

## 2018-09-14 DIAGNOSIS — Z51.81: Primary | ICD-10-CM

## 2018-09-14 DIAGNOSIS — Z79.01: ICD-10-CM

## 2018-09-14 DIAGNOSIS — I48.91: ICD-10-CM

## 2018-09-14 DIAGNOSIS — E78.5: ICD-10-CM

## 2018-09-14 DIAGNOSIS — I10: ICD-10-CM

## 2018-09-14 DIAGNOSIS — E66.09: ICD-10-CM

## 2018-09-14 PROCEDURE — 85610 PROTHROMBIN TIME: CPT

## 2018-09-21 ENCOUNTER — HOSPITAL ENCOUNTER (OUTPATIENT)
Dept: HOSPITAL 61 - PCVCCLINIC | Age: 73
Discharge: HOME | End: 2018-09-21
Attending: INTERNAL MEDICINE
Payer: MEDICARE

## 2018-09-21 DIAGNOSIS — Z95.0: ICD-10-CM

## 2018-09-21 DIAGNOSIS — I10: ICD-10-CM

## 2018-09-21 DIAGNOSIS — Z79.01: ICD-10-CM

## 2018-09-21 DIAGNOSIS — E66.09: ICD-10-CM

## 2018-09-21 DIAGNOSIS — I48.91: ICD-10-CM

## 2018-09-21 DIAGNOSIS — E11.9: ICD-10-CM

## 2018-09-21 DIAGNOSIS — Z88.8: ICD-10-CM

## 2018-09-21 DIAGNOSIS — E78.00: ICD-10-CM

## 2018-09-21 DIAGNOSIS — Z51.81: Primary | ICD-10-CM

## 2018-09-21 DIAGNOSIS — E78.5: ICD-10-CM

## 2018-09-21 PROCEDURE — 85610 PROTHROMBIN TIME: CPT

## 2018-10-05 ENCOUNTER — HOSPITAL ENCOUNTER (OUTPATIENT)
Dept: HOSPITAL 61 - PCVCCLINIC | Age: 73
Discharge: HOME | End: 2018-10-05
Attending: INTERNAL MEDICINE
Payer: MEDICARE

## 2018-10-05 DIAGNOSIS — E78.5: ICD-10-CM

## 2018-10-05 DIAGNOSIS — E78.00: ICD-10-CM

## 2018-10-05 DIAGNOSIS — Z88.8: ICD-10-CM

## 2018-10-05 DIAGNOSIS — E66.09: ICD-10-CM

## 2018-10-05 DIAGNOSIS — Z95.0: ICD-10-CM

## 2018-10-05 DIAGNOSIS — Z51.81: Primary | ICD-10-CM

## 2018-10-05 DIAGNOSIS — E11.9: ICD-10-CM

## 2018-10-05 DIAGNOSIS — I10: ICD-10-CM

## 2018-10-05 DIAGNOSIS — I48.91: ICD-10-CM

## 2018-10-05 DIAGNOSIS — Z79.01: ICD-10-CM

## 2018-10-05 PROCEDURE — 85610 PROTHROMBIN TIME: CPT

## 2018-10-12 ENCOUNTER — HOSPITAL ENCOUNTER (OUTPATIENT)
Dept: HOSPITAL 61 - PCVCCLINIC | Age: 73
Discharge: HOME | End: 2018-10-12
Attending: INTERNAL MEDICINE
Payer: MEDICARE

## 2018-10-12 DIAGNOSIS — Z51.81: Primary | ICD-10-CM

## 2018-10-12 DIAGNOSIS — Z79.01: ICD-10-CM

## 2018-10-12 DIAGNOSIS — I10: ICD-10-CM

## 2018-10-12 DIAGNOSIS — E78.5: ICD-10-CM

## 2018-10-12 DIAGNOSIS — E11.9: ICD-10-CM

## 2018-10-12 DIAGNOSIS — E66.09: ICD-10-CM

## 2018-10-12 DIAGNOSIS — I48.91: ICD-10-CM

## 2018-10-12 DIAGNOSIS — Z95.0: ICD-10-CM

## 2018-10-12 DIAGNOSIS — E78.00: ICD-10-CM

## 2018-10-12 DIAGNOSIS — Z88.8: ICD-10-CM

## 2018-10-12 PROCEDURE — 85610 PROTHROMBIN TIME: CPT

## 2018-10-30 ENCOUNTER — HOSPITAL ENCOUNTER (OUTPATIENT)
Dept: HOSPITAL 61 - PCVCCLINIC | Age: 73
Discharge: HOME | End: 2018-10-30
Attending: INTERNAL MEDICINE
Payer: MEDICARE

## 2018-10-30 DIAGNOSIS — I10: ICD-10-CM

## 2018-10-30 DIAGNOSIS — E11.9: ICD-10-CM

## 2018-10-30 DIAGNOSIS — Z79.01: ICD-10-CM

## 2018-10-30 DIAGNOSIS — E78.00: ICD-10-CM

## 2018-10-30 DIAGNOSIS — E78.5: ICD-10-CM

## 2018-10-30 DIAGNOSIS — I48.91: ICD-10-CM

## 2018-10-30 DIAGNOSIS — Z51.81: Primary | ICD-10-CM

## 2018-10-30 PROCEDURE — 85610 PROTHROMBIN TIME: CPT

## 2018-12-27 ENCOUNTER — HOSPITAL ENCOUNTER (OUTPATIENT)
Dept: HOSPITAL 61 - PCVCCLINIC | Age: 73
Discharge: HOME | End: 2018-12-27
Attending: INTERNAL MEDICINE
Payer: MEDICARE

## 2018-12-27 DIAGNOSIS — I10: ICD-10-CM

## 2018-12-27 DIAGNOSIS — Z95.0: ICD-10-CM

## 2018-12-27 DIAGNOSIS — Z79.01: ICD-10-CM

## 2018-12-27 DIAGNOSIS — E11.9: ICD-10-CM

## 2018-12-27 DIAGNOSIS — E78.5: ICD-10-CM

## 2018-12-27 DIAGNOSIS — I48.91: ICD-10-CM

## 2018-12-27 DIAGNOSIS — Z51.81: Primary | ICD-10-CM

## 2018-12-27 DIAGNOSIS — E78.00: ICD-10-CM

## 2018-12-27 PROCEDURE — 36415 COLL VENOUS BLD VENIPUNCTURE: CPT

## 2018-12-27 PROCEDURE — 85610 PROTHROMBIN TIME: CPT

## 2019-01-24 ENCOUNTER — HOSPITAL ENCOUNTER (OUTPATIENT)
Dept: HOSPITAL 61 - PCVCCLINIC | Age: 74
Discharge: HOME | End: 2019-01-24
Attending: INTERNAL MEDICINE
Payer: MEDICARE

## 2019-01-24 DIAGNOSIS — I48.91: ICD-10-CM

## 2019-01-24 DIAGNOSIS — Z79.01: ICD-10-CM

## 2019-01-24 DIAGNOSIS — Z51.81: Primary | ICD-10-CM

## 2019-01-24 DIAGNOSIS — E78.00: ICD-10-CM

## 2019-01-24 DIAGNOSIS — E78.5: ICD-10-CM

## 2019-01-24 DIAGNOSIS — Z95.0: ICD-10-CM

## 2019-01-24 DIAGNOSIS — E11.9: ICD-10-CM

## 2019-01-24 DIAGNOSIS — I10: ICD-10-CM

## 2019-01-24 PROCEDURE — 80061 LIPID PANEL: CPT

## 2019-01-24 PROCEDURE — 85610 PROTHROMBIN TIME: CPT

## 2019-01-24 PROCEDURE — 93005 ELECTROCARDIOGRAM TRACING: CPT

## 2019-01-24 PROCEDURE — 36415 COLL VENOUS BLD VENIPUNCTURE: CPT

## 2019-01-24 PROCEDURE — G0463 HOSPITAL OUTPT CLINIC VISIT: HCPCS

## 2019-02-21 ENCOUNTER — HOSPITAL ENCOUNTER (OUTPATIENT)
Dept: HOSPITAL 61 - PCVCCLINIC | Age: 74
Discharge: HOME | End: 2019-02-21
Attending: INTERNAL MEDICINE
Payer: MEDICARE

## 2019-02-21 DIAGNOSIS — Z51.81: Primary | ICD-10-CM

## 2019-02-21 DIAGNOSIS — Z95.0: ICD-10-CM

## 2019-02-21 DIAGNOSIS — Z79.01: ICD-10-CM

## 2019-02-21 DIAGNOSIS — I10: ICD-10-CM

## 2019-02-21 DIAGNOSIS — E11.9: ICD-10-CM

## 2019-02-21 DIAGNOSIS — E78.00: ICD-10-CM

## 2019-02-21 DIAGNOSIS — Z88.8: ICD-10-CM

## 2019-02-21 DIAGNOSIS — I48.91: ICD-10-CM

## 2019-02-21 DIAGNOSIS — E78.5: ICD-10-CM

## 2019-02-21 PROCEDURE — 36415 COLL VENOUS BLD VENIPUNCTURE: CPT

## 2019-02-21 PROCEDURE — 85610 PROTHROMBIN TIME: CPT

## 2019-02-25 ENCOUNTER — HOSPITAL ENCOUNTER (OUTPATIENT)
Dept: HOSPITAL 61 - PCVCCLINIC | Age: 74
Discharge: HOME | End: 2019-02-25
Attending: INTERNAL MEDICINE
Payer: MEDICARE

## 2019-02-25 DIAGNOSIS — Z95.0: ICD-10-CM

## 2019-02-25 DIAGNOSIS — Z48.812: Primary | ICD-10-CM

## 2019-02-25 DIAGNOSIS — R00.1: ICD-10-CM

## 2019-02-25 DIAGNOSIS — I48.91: ICD-10-CM

## 2019-02-25 PROCEDURE — 93280 PM DEVICE PROGR EVAL DUAL: CPT

## 2019-03-01 ENCOUNTER — HOSPITAL ENCOUNTER (OUTPATIENT)
Dept: HOSPITAL 61 - PCVCCLINIC | Age: 74
Discharge: HOME | End: 2019-03-01
Attending: INTERNAL MEDICINE
Payer: MEDICARE

## 2019-03-01 DIAGNOSIS — E78.5: ICD-10-CM

## 2019-03-01 DIAGNOSIS — E78.00: ICD-10-CM

## 2019-03-01 DIAGNOSIS — Z51.81: Primary | ICD-10-CM

## 2019-03-01 DIAGNOSIS — I48.91: ICD-10-CM

## 2019-03-01 DIAGNOSIS — Z88.8: ICD-10-CM

## 2019-03-01 DIAGNOSIS — E66.09: ICD-10-CM

## 2019-03-01 DIAGNOSIS — Z79.01: ICD-10-CM

## 2019-03-01 DIAGNOSIS — Z88.4: ICD-10-CM

## 2019-03-01 DIAGNOSIS — I10: ICD-10-CM

## 2019-03-01 PROCEDURE — 85610 PROTHROMBIN TIME: CPT

## 2019-03-01 PROCEDURE — 36415 COLL VENOUS BLD VENIPUNCTURE: CPT

## 2019-03-15 ENCOUNTER — HOSPITAL ENCOUNTER (OUTPATIENT)
Dept: HOSPITAL 61 - PCVCCLINIC | Age: 74
Discharge: HOME | End: 2019-03-15
Attending: INTERNAL MEDICINE
Payer: MEDICARE

## 2019-03-15 DIAGNOSIS — I48.91: ICD-10-CM

## 2019-03-15 DIAGNOSIS — E11.9: ICD-10-CM

## 2019-03-15 DIAGNOSIS — E78.5: ICD-10-CM

## 2019-03-15 DIAGNOSIS — E78.00: ICD-10-CM

## 2019-03-15 DIAGNOSIS — Z51.81: Primary | ICD-10-CM

## 2019-03-15 DIAGNOSIS — Z95.0: ICD-10-CM

## 2019-03-15 DIAGNOSIS — Z79.01: ICD-10-CM

## 2019-03-15 DIAGNOSIS — I10: ICD-10-CM

## 2019-03-15 DIAGNOSIS — Z88.8: ICD-10-CM

## 2019-03-15 PROCEDURE — 85610 PROTHROMBIN TIME: CPT

## 2019-03-15 PROCEDURE — 36415 COLL VENOUS BLD VENIPUNCTURE: CPT

## 2019-04-12 ENCOUNTER — HOSPITAL ENCOUNTER (OUTPATIENT)
Dept: HOSPITAL 61 - PCVCCLINIC | Age: 74
Discharge: HOME | End: 2019-04-12
Attending: INTERNAL MEDICINE
Payer: MEDICARE

## 2019-04-12 DIAGNOSIS — E78.5: ICD-10-CM

## 2019-04-12 DIAGNOSIS — Z51.81: Primary | ICD-10-CM

## 2019-04-12 DIAGNOSIS — I48.91: ICD-10-CM

## 2019-04-12 DIAGNOSIS — E11.9: ICD-10-CM

## 2019-04-12 DIAGNOSIS — I10: ICD-10-CM

## 2019-04-12 DIAGNOSIS — Z88.8: ICD-10-CM

## 2019-04-12 DIAGNOSIS — E78.00: ICD-10-CM

## 2019-04-12 DIAGNOSIS — Z95.0: ICD-10-CM

## 2019-04-12 DIAGNOSIS — Z79.01: ICD-10-CM

## 2019-04-12 PROCEDURE — 36415 COLL VENOUS BLD VENIPUNCTURE: CPT

## 2019-04-12 PROCEDURE — 85610 PROTHROMBIN TIME: CPT

## 2019-04-19 ENCOUNTER — HOSPITAL ENCOUNTER (OUTPATIENT)
Dept: HOSPITAL 61 - PCVCCLINIC | Age: 74
Discharge: HOME | End: 2019-04-19
Attending: INTERNAL MEDICINE
Payer: MEDICARE

## 2019-04-19 DIAGNOSIS — I10: ICD-10-CM

## 2019-04-19 DIAGNOSIS — I48.91: ICD-10-CM

## 2019-04-19 DIAGNOSIS — Z79.01: ICD-10-CM

## 2019-04-19 DIAGNOSIS — Z51.81: Primary | ICD-10-CM

## 2019-04-19 DIAGNOSIS — E78.5: ICD-10-CM

## 2019-04-19 DIAGNOSIS — Z95.0: ICD-10-CM

## 2019-04-19 PROCEDURE — 85610 PROTHROMBIN TIME: CPT

## 2019-04-19 PROCEDURE — 36415 COLL VENOUS BLD VENIPUNCTURE: CPT

## 2019-05-03 ENCOUNTER — HOSPITAL ENCOUNTER (OUTPATIENT)
Dept: HOSPITAL 61 - PCVCCLINIC | Age: 74
Discharge: HOME | End: 2019-05-03
Attending: INTERNAL MEDICINE
Payer: MEDICARE

## 2019-05-03 DIAGNOSIS — E78.5: ICD-10-CM

## 2019-05-03 DIAGNOSIS — I10: ICD-10-CM

## 2019-05-03 DIAGNOSIS — Z51.81: Primary | ICD-10-CM

## 2019-05-03 DIAGNOSIS — E11.9: ICD-10-CM

## 2019-05-03 DIAGNOSIS — I48.91: ICD-10-CM

## 2019-05-03 DIAGNOSIS — Z79.01: ICD-10-CM

## 2019-05-03 PROCEDURE — 85610 PROTHROMBIN TIME: CPT

## 2019-05-03 PROCEDURE — 36415 COLL VENOUS BLD VENIPUNCTURE: CPT

## 2019-05-31 ENCOUNTER — HOSPITAL ENCOUNTER (OUTPATIENT)
Dept: HOSPITAL 61 - PCVCCLINIC | Age: 74
Discharge: HOME | End: 2019-05-31
Attending: INTERNAL MEDICINE
Payer: MEDICARE

## 2019-05-31 DIAGNOSIS — Z88.6: ICD-10-CM

## 2019-05-31 DIAGNOSIS — E78.00: ICD-10-CM

## 2019-05-31 DIAGNOSIS — Z79.01: ICD-10-CM

## 2019-05-31 DIAGNOSIS — Z88.8: ICD-10-CM

## 2019-05-31 DIAGNOSIS — I48.91: ICD-10-CM

## 2019-05-31 DIAGNOSIS — Z51.81: Primary | ICD-10-CM

## 2019-05-31 DIAGNOSIS — E78.5: ICD-10-CM

## 2019-05-31 DIAGNOSIS — I10: ICD-10-CM

## 2019-05-31 DIAGNOSIS — E11.9: ICD-10-CM

## 2019-05-31 PROCEDURE — 85610 PROTHROMBIN TIME: CPT

## 2019-05-31 PROCEDURE — 36415 COLL VENOUS BLD VENIPUNCTURE: CPT

## 2019-06-10 ENCOUNTER — HOSPITAL ENCOUNTER (OUTPATIENT)
Dept: HOSPITAL 61 - PCVCCLINIC | Age: 74
Discharge: HOME | End: 2019-06-10
Attending: INTERNAL MEDICINE
Payer: MEDICARE

## 2019-06-10 DIAGNOSIS — E11.9: ICD-10-CM

## 2019-06-10 DIAGNOSIS — E78.00: ICD-10-CM

## 2019-06-10 DIAGNOSIS — Z88.8: ICD-10-CM

## 2019-06-10 DIAGNOSIS — I48.91: ICD-10-CM

## 2019-06-10 DIAGNOSIS — I10: ICD-10-CM

## 2019-06-10 DIAGNOSIS — Z95.0: ICD-10-CM

## 2019-06-10 DIAGNOSIS — Z79.01: ICD-10-CM

## 2019-06-10 DIAGNOSIS — Z51.81: Primary | ICD-10-CM

## 2019-06-10 PROCEDURE — 85610 PROTHROMBIN TIME: CPT

## 2019-06-10 PROCEDURE — 36415 COLL VENOUS BLD VENIPUNCTURE: CPT

## 2019-07-08 ENCOUNTER — HOSPITAL ENCOUNTER (OUTPATIENT)
Dept: HOSPITAL 61 - PCVCCLINIC | Age: 74
Discharge: HOME | End: 2019-07-08
Attending: INTERNAL MEDICINE
Payer: MEDICARE

## 2019-07-08 DIAGNOSIS — Z79.01: ICD-10-CM

## 2019-07-08 DIAGNOSIS — I48.91: ICD-10-CM

## 2019-07-08 DIAGNOSIS — E11.9: ICD-10-CM

## 2019-07-08 DIAGNOSIS — Z51.81: Primary | ICD-10-CM

## 2019-07-08 DIAGNOSIS — E78.5: ICD-10-CM

## 2019-07-08 PROCEDURE — 36415 COLL VENOUS BLD VENIPUNCTURE: CPT

## 2019-07-08 PROCEDURE — 85610 PROTHROMBIN TIME: CPT

## 2019-07-15 ENCOUNTER — HOSPITAL ENCOUNTER (OUTPATIENT)
Dept: HOSPITAL 61 - PCVCCLINIC | Age: 74
Discharge: HOME | End: 2019-07-15
Attending: INTERNAL MEDICINE
Payer: MEDICARE

## 2019-07-15 DIAGNOSIS — Z51.81: Primary | ICD-10-CM

## 2019-07-15 DIAGNOSIS — E11.9: ICD-10-CM

## 2019-07-15 DIAGNOSIS — E78.5: ICD-10-CM

## 2019-07-15 DIAGNOSIS — I10: ICD-10-CM

## 2019-07-15 DIAGNOSIS — Z87.891: ICD-10-CM

## 2019-07-15 DIAGNOSIS — E78.00: ICD-10-CM

## 2019-07-15 PROCEDURE — 36415 COLL VENOUS BLD VENIPUNCTURE: CPT

## 2019-07-15 PROCEDURE — 85610 PROTHROMBIN TIME: CPT

## 2019-07-29 ENCOUNTER — HOSPITAL ENCOUNTER (OUTPATIENT)
Dept: HOSPITAL 61 - PCVCCLINIC | Age: 74
Discharge: HOME | End: 2019-07-29
Attending: INTERNAL MEDICINE
Payer: MEDICARE

## 2019-07-29 DIAGNOSIS — Z79.01: ICD-10-CM

## 2019-07-29 DIAGNOSIS — I10: ICD-10-CM

## 2019-07-29 DIAGNOSIS — E11.9: ICD-10-CM

## 2019-07-29 DIAGNOSIS — Z88.8: ICD-10-CM

## 2019-07-29 DIAGNOSIS — Z95.0: ICD-10-CM

## 2019-07-29 DIAGNOSIS — E78.00: ICD-10-CM

## 2019-07-29 DIAGNOSIS — I48.91: ICD-10-CM

## 2019-07-29 DIAGNOSIS — Z51.81: Primary | ICD-10-CM

## 2019-07-29 PROCEDURE — 85610 PROTHROMBIN TIME: CPT

## 2019-07-29 PROCEDURE — 36415 COLL VENOUS BLD VENIPUNCTURE: CPT

## 2019-08-06 ENCOUNTER — HOSPITAL ENCOUNTER (OUTPATIENT)
Dept: HOSPITAL 61 - PCVCCLINIC | Age: 74
Discharge: HOME | End: 2019-08-06
Attending: INTERNAL MEDICINE
Payer: MEDICARE

## 2019-08-06 DIAGNOSIS — Z79.01: ICD-10-CM

## 2019-08-06 DIAGNOSIS — I10: ICD-10-CM

## 2019-08-06 DIAGNOSIS — I48.91: ICD-10-CM

## 2019-08-06 DIAGNOSIS — E11.9: ICD-10-CM

## 2019-08-06 DIAGNOSIS — E78.5: ICD-10-CM

## 2019-08-06 DIAGNOSIS — Z51.81: Primary | ICD-10-CM

## 2019-08-06 PROCEDURE — 36415 COLL VENOUS BLD VENIPUNCTURE: CPT

## 2019-08-06 PROCEDURE — 85610 PROTHROMBIN TIME: CPT

## 2019-08-23 ENCOUNTER — HOSPITAL ENCOUNTER (OUTPATIENT)
Dept: HOSPITAL 61 - PCVCIMAG | Age: 74
Discharge: HOME | End: 2019-08-23
Attending: INTERNAL MEDICINE
Payer: MEDICARE

## 2019-08-23 DIAGNOSIS — I08.3: Primary | ICD-10-CM

## 2019-08-23 DIAGNOSIS — I10: ICD-10-CM

## 2019-08-23 DIAGNOSIS — E78.5: ICD-10-CM

## 2019-08-23 DIAGNOSIS — Z88.8: ICD-10-CM

## 2019-08-23 DIAGNOSIS — Z88.4: ICD-10-CM

## 2019-08-23 DIAGNOSIS — I48.91: ICD-10-CM

## 2019-08-23 PROCEDURE — 93306 TTE W/DOPPLER COMPLETE: CPT

## 2019-08-23 PROCEDURE — 85610 PROTHROMBIN TIME: CPT

## 2019-08-23 PROCEDURE — 36415 COLL VENOUS BLD VENIPUNCTURE: CPT

## 2019-08-23 NOTE — PCVCIMAG
--------------- APPROVED REPORT --------------





Study performed:  2019 13:27:12



EXAM: Comprehensive 2D, Doppler, and color-flow 

Echocardiogram

Patient Location: Echo lab

Status:  routine



BSA:         2.14

HR: 84 bpmBP:          132/88 mmHg

Rhythm: Atrial Fibrillation



Other Information 

Study Quality: Adequate



Risk Factors: 

Cardiac Risk Factors:  HTN, Hyperlipidemia



Indications

Aortic Valve Disease

Pacemaker



2D Dimensions

IVSd:  12.46 (7-11mm)LVOT Diam:  23.00 (18-24mm) 

LVDd:  44.80 mm

PWd:  11.16 (7-11mm)Ascending Ao:  39.12 (22-36mm)

LVDs:  32.23 (25-40mm)

Left Atrium:  43.79 (27-40mm)

Aortic Root:  35.65 mm

LV Single Plane 4CH:  58.24 %

LV Single Plane 2CH:  52.21 %

Biplane EF:  57.1 %



Volumes

Left Atrial Volume (Systole)

Single Plane 4CH:  84.68 mLSingle Plane 2CH:  81.07 mL

LA ESV Index:  39.00 mL/m2



Aortic Valve

AoV Peak Kareem.:  2.33 m/s

AO Peak Gr.:  21.79 mmHgLVOT Max P.65 mmHg

AO Mean Gr.:  12.21 mmHgLVOT Mean PG:  3.03 mmHg

AO V2 Mean:  1.65 m/sLVOT Max V:  1.08 m/s

AO V2 VTI:  44.03 cmLVOT Mean V:  0.84 m/s

BOSTON (VTI):  2.09 [id number] V1 VTI:  23.03 cm

BO[place]ON Vmax: 1.85 cm2

AI Vmax:  3.54 m/sSV (LVOT):  91.90 mL

AI Mesa:  1.32 m/s2

AI PHT:  820.84 ms



Pulmonary Valve

PV Peak Kareem.:  1.13 m/sPV Peak Gr.:  5.13 mmHg



Tricuspid Valve

TR Peak Kareem.:  2.81 m/sRAP Estimate:  7.00 mmHg

TR Peak Gr.:  31.49 mmHg

PA Pressure:  39.00 mmHg



Left Ventricle

The left ventricle is normal size. There is normal LV segmental wall 

motion. Borderline concentric left ventricular hypertrophy. Left 

ventricular systolic function is normal. The left ventricular 

ejection fraction is within the normal range. LVEF is 55-60%. This 

study is not technically sufficient to allow evaluation of the LV 

diastolic function due to atrial fibrillation.



Right Ventricle

The right ventricle is normal size. The right ventricular systolic 

function is normal. Pacemaker lead is present in the right heart. 



Atria

Left atrium is mildly dilated. Right atrium is mildly 

dilated.



Aortic Valve

The Aortic valve is sclerotic. Mild aortic regurgitation. The peak 

aortic valve pressure gradient is 22 mmHg and the mean gradient is 12 

mmHg. The calculated aortic valve area is 1.8 cm2.



Mitral Valve

The mitral valve is normal in structure. Mild mitral regurgitation. 

No evidence of mitral valve stenosis.



Tricuspid Valve

The tricuspid valve is normal in structure. Mild tricuspid 

regurgitation. The pulmonary artery pressure is 39 mmHg.



Pulmonic Valve

The pulmonary valve is normal in structure. Trace pulmonic 

regurgitation.



Great Vessels

The aortic root is normal in size. The ascending aorta measures 3.9 

cm. IVC is normal in size and collapses >50% with 

inspiration.



Pericardium

There is no pericardial effusion.



<Conclusion>

The left ventricle is normal size.

Borderline concentric left ventricular hypertrophy.

Left ventricular systolic function is normal.

The right ventricle is normal size.

Pacemaker lead is present in the right heart.

Left atrium is mildly dilated.

Right atrium is mildly dilated.

The Aortic valve is sclerotic.

Mild aortic regurgitation.

Mild mitral regurgitation.

Mild tricuspid regurgitation. The pulmonary artery pressure is 39 

mmHg.

## 2019-08-30 ENCOUNTER — HOSPITAL ENCOUNTER (OUTPATIENT)
Dept: HOSPITAL 61 - PCVCCLINIC | Age: 74
Discharge: HOME | End: 2019-08-30
Attending: INTERNAL MEDICINE
Payer: MEDICARE

## 2019-08-30 DIAGNOSIS — Z51.81: Primary | ICD-10-CM

## 2019-08-30 DIAGNOSIS — I10: ICD-10-CM

## 2019-08-30 DIAGNOSIS — Z88.4: ICD-10-CM

## 2019-08-30 DIAGNOSIS — E11.9: ICD-10-CM

## 2019-08-30 DIAGNOSIS — E78.00: ICD-10-CM

## 2019-08-30 DIAGNOSIS — I48.91: ICD-10-CM

## 2019-08-30 DIAGNOSIS — Z79.01: ICD-10-CM

## 2019-08-30 PROCEDURE — 36415 COLL VENOUS BLD VENIPUNCTURE: CPT

## 2019-08-30 PROCEDURE — 85610 PROTHROMBIN TIME: CPT

## 2019-09-16 ENCOUNTER — HOSPITAL ENCOUNTER (OUTPATIENT)
Dept: HOSPITAL 61 - PCVCCLINIC | Age: 74
Discharge: HOME | End: 2019-09-16
Attending: INTERNAL MEDICINE
Payer: MEDICARE

## 2019-09-16 DIAGNOSIS — Z51.81: Primary | ICD-10-CM

## 2019-09-16 DIAGNOSIS — I48.91: ICD-10-CM

## 2019-09-16 DIAGNOSIS — Z79.01: ICD-10-CM

## 2019-09-16 DIAGNOSIS — E11.9: ICD-10-CM

## 2019-09-16 DIAGNOSIS — Z88.8: ICD-10-CM

## 2019-09-16 DIAGNOSIS — E78.00: ICD-10-CM

## 2019-09-16 PROCEDURE — 85610 PROTHROMBIN TIME: CPT

## 2019-09-16 PROCEDURE — 36415 COLL VENOUS BLD VENIPUNCTURE: CPT

## 2019-09-30 ENCOUNTER — HOSPITAL ENCOUNTER (OUTPATIENT)
Dept: HOSPITAL 61 - PCVCCLINIC | Age: 74
End: 2019-09-30
Attending: INTERNAL MEDICINE
Payer: MEDICARE

## 2019-09-30 DIAGNOSIS — I48.91: Primary | ICD-10-CM

## 2019-09-30 PROCEDURE — 36415 COLL VENOUS BLD VENIPUNCTURE: CPT

## 2019-09-30 PROCEDURE — 85610 PROTHROMBIN TIME: CPT

## 2019-11-18 ENCOUNTER — HOSPITAL ENCOUNTER (OUTPATIENT)
Dept: HOSPITAL 61 - PCVCCLINIC | Age: 74
Discharge: HOME | End: 2019-11-18
Attending: INTERNAL MEDICINE
Payer: MEDICARE

## 2019-11-18 DIAGNOSIS — Z51.81: Primary | ICD-10-CM

## 2019-11-18 DIAGNOSIS — I48.91: ICD-10-CM

## 2019-11-18 DIAGNOSIS — Z95.0: ICD-10-CM

## 2019-11-18 DIAGNOSIS — I10: ICD-10-CM

## 2019-11-18 DIAGNOSIS — Z79.01: ICD-10-CM

## 2019-11-18 DIAGNOSIS — E78.5: ICD-10-CM

## 2019-11-18 DIAGNOSIS — E78.00: ICD-10-CM

## 2019-11-18 PROCEDURE — 85610 PROTHROMBIN TIME: CPT

## 2019-11-18 PROCEDURE — 36415 COLL VENOUS BLD VENIPUNCTURE: CPT

## 2019-12-02 ENCOUNTER — HOSPITAL ENCOUNTER (OUTPATIENT)
Dept: HOSPITAL 61 - PCVCCLINIC | Age: 74
Discharge: HOME | End: 2019-12-02
Attending: INTERNAL MEDICINE
Payer: MEDICARE

## 2019-12-02 DIAGNOSIS — E66.09: ICD-10-CM

## 2019-12-02 DIAGNOSIS — E78.00: ICD-10-CM

## 2019-12-02 DIAGNOSIS — Z90.09: ICD-10-CM

## 2019-12-02 DIAGNOSIS — Z82.49: ICD-10-CM

## 2019-12-02 DIAGNOSIS — Z87.891: ICD-10-CM

## 2019-12-02 DIAGNOSIS — I10: ICD-10-CM

## 2019-12-02 DIAGNOSIS — Z90.49: ICD-10-CM

## 2019-12-02 DIAGNOSIS — Z51.81: Primary | ICD-10-CM

## 2019-12-02 DIAGNOSIS — I48.0: ICD-10-CM

## 2019-12-02 DIAGNOSIS — E11.9: ICD-10-CM

## 2019-12-02 DIAGNOSIS — Z79.01: ICD-10-CM

## 2019-12-02 DIAGNOSIS — E78.5: ICD-10-CM

## 2019-12-02 DIAGNOSIS — Z95.0: ICD-10-CM

## 2019-12-02 DIAGNOSIS — Z88.8: ICD-10-CM

## 2019-12-02 PROCEDURE — 85610 PROTHROMBIN TIME: CPT

## 2019-12-02 PROCEDURE — 36415 COLL VENOUS BLD VENIPUNCTURE: CPT

## 2019-12-23 ENCOUNTER — HOSPITAL ENCOUNTER (OUTPATIENT)
Dept: HOSPITAL 61 - PCVCCLINIC | Age: 74
Discharge: HOME | End: 2019-12-23
Attending: INTERNAL MEDICINE
Payer: MEDICARE

## 2019-12-23 DIAGNOSIS — Z51.81: Primary | ICD-10-CM

## 2019-12-23 DIAGNOSIS — I10: ICD-10-CM

## 2019-12-23 DIAGNOSIS — Z79.01: ICD-10-CM

## 2019-12-23 DIAGNOSIS — E66.09: ICD-10-CM

## 2019-12-23 DIAGNOSIS — Z88.8: ICD-10-CM

## 2019-12-23 DIAGNOSIS — E11.9: ICD-10-CM

## 2019-12-23 DIAGNOSIS — Z95.1: ICD-10-CM

## 2019-12-23 DIAGNOSIS — E78.00: ICD-10-CM

## 2019-12-23 DIAGNOSIS — E78.5: ICD-10-CM

## 2019-12-23 DIAGNOSIS — I48.91: ICD-10-CM

## 2019-12-23 PROCEDURE — 85610 PROTHROMBIN TIME: CPT

## 2019-12-23 PROCEDURE — 36415 COLL VENOUS BLD VENIPUNCTURE: CPT

## 2020-01-20 ENCOUNTER — HOSPITAL ENCOUNTER (OUTPATIENT)
Dept: HOSPITAL 35 - SJCVC | Age: 75
End: 2020-01-20
Attending: INTERNAL MEDICINE
Payer: COMMERCIAL

## 2020-01-20 DIAGNOSIS — E78.00: ICD-10-CM

## 2020-01-20 DIAGNOSIS — Z95.0: ICD-10-CM

## 2020-01-20 DIAGNOSIS — E66.9: ICD-10-CM

## 2020-01-20 DIAGNOSIS — Z51.81: Primary | ICD-10-CM

## 2020-01-20 DIAGNOSIS — E11.9: ICD-10-CM

## 2020-01-20 DIAGNOSIS — Z79.01: ICD-10-CM

## 2020-01-20 DIAGNOSIS — I10: ICD-10-CM

## 2020-01-20 DIAGNOSIS — I48.91: ICD-10-CM

## 2020-02-17 ENCOUNTER — HOSPITAL ENCOUNTER (OUTPATIENT)
Dept: HOSPITAL 35 - SJCVC | Age: 75
End: 2020-02-17
Attending: INTERNAL MEDICINE
Payer: COMMERCIAL

## 2020-02-17 DIAGNOSIS — E11.40: ICD-10-CM

## 2020-02-17 DIAGNOSIS — I48.0: ICD-10-CM

## 2020-02-17 DIAGNOSIS — I10: ICD-10-CM

## 2020-02-17 DIAGNOSIS — Z85.51: ICD-10-CM

## 2020-02-17 DIAGNOSIS — Z79.899: ICD-10-CM

## 2020-02-17 DIAGNOSIS — Z79.01: ICD-10-CM

## 2020-02-17 DIAGNOSIS — Z87.891: ICD-10-CM

## 2020-02-17 DIAGNOSIS — Z90.49: ICD-10-CM

## 2020-02-17 DIAGNOSIS — Z79.84: ICD-10-CM

## 2020-02-17 DIAGNOSIS — Z51.81: Primary | ICD-10-CM

## 2020-02-17 DIAGNOSIS — Z88.8: ICD-10-CM

## 2020-02-25 ENCOUNTER — HOSPITAL ENCOUNTER (OUTPATIENT)
Dept: HOSPITAL 35 - SJCVC | Age: 75
End: 2020-02-25
Attending: INTERNAL MEDICINE
Payer: COMMERCIAL

## 2020-02-25 DIAGNOSIS — Z85.51: ICD-10-CM

## 2020-02-25 DIAGNOSIS — I48.0: ICD-10-CM

## 2020-02-25 DIAGNOSIS — Z79.01: ICD-10-CM

## 2020-02-25 DIAGNOSIS — I48.21: Primary | ICD-10-CM

## 2020-02-25 DIAGNOSIS — Z87.891: ICD-10-CM

## 2020-02-25 DIAGNOSIS — Z72.89: ICD-10-CM

## 2020-02-25 DIAGNOSIS — Z79.84: ICD-10-CM

## 2020-02-25 DIAGNOSIS — Z79.899: ICD-10-CM

## 2020-02-25 DIAGNOSIS — R60.9: ICD-10-CM

## 2020-02-25 DIAGNOSIS — E78.5: ICD-10-CM

## 2020-02-25 DIAGNOSIS — E11.40: ICD-10-CM

## 2020-02-25 DIAGNOSIS — Z88.8: ICD-10-CM

## 2020-02-25 DIAGNOSIS — E78.00: ICD-10-CM

## 2020-02-25 DIAGNOSIS — I10: ICD-10-CM

## 2020-05-12 ENCOUNTER — HOSPITAL ENCOUNTER (OUTPATIENT)
Dept: HOSPITAL 35 - SJCVC | Age: 75
End: 2020-05-12
Attending: INTERNAL MEDICINE
Payer: COMMERCIAL

## 2020-05-12 DIAGNOSIS — Z87.891: ICD-10-CM

## 2020-05-12 DIAGNOSIS — Z51.81: Primary | ICD-10-CM

## 2020-05-12 DIAGNOSIS — I48.19: ICD-10-CM

## 2020-05-12 DIAGNOSIS — Z79.01: ICD-10-CM

## 2020-06-09 ENCOUNTER — HOSPITAL ENCOUNTER (OUTPATIENT)
Dept: HOSPITAL 35 - SJCVC | Age: 75
End: 2020-06-09
Attending: INTERNAL MEDICINE
Payer: COMMERCIAL

## 2020-06-09 DIAGNOSIS — I10: ICD-10-CM

## 2020-06-09 DIAGNOSIS — E78.5: ICD-10-CM

## 2020-06-09 DIAGNOSIS — E78.00: ICD-10-CM

## 2020-06-09 DIAGNOSIS — I48.91: ICD-10-CM

## 2020-06-09 DIAGNOSIS — Z79.899: ICD-10-CM

## 2020-06-09 DIAGNOSIS — Z95.0: ICD-10-CM

## 2020-06-09 DIAGNOSIS — Z51.81: Primary | ICD-10-CM

## 2020-06-09 DIAGNOSIS — Z79.01: ICD-10-CM

## 2020-07-14 ENCOUNTER — HOSPITAL ENCOUNTER (OUTPATIENT)
Dept: HOSPITAL 35 - SJCVC | Age: 75
End: 2020-07-14
Attending: INTERNAL MEDICINE
Payer: COMMERCIAL

## 2020-07-14 DIAGNOSIS — Z79.899: ICD-10-CM

## 2020-07-14 DIAGNOSIS — E78.5: ICD-10-CM

## 2020-07-14 DIAGNOSIS — I48.91: ICD-10-CM

## 2020-07-14 DIAGNOSIS — Z79.01: ICD-10-CM

## 2020-07-14 DIAGNOSIS — E78.00: ICD-10-CM

## 2020-07-14 DIAGNOSIS — Z79.84: ICD-10-CM

## 2020-07-14 DIAGNOSIS — E11.9: ICD-10-CM

## 2020-07-14 DIAGNOSIS — Z95.0: ICD-10-CM

## 2020-07-14 DIAGNOSIS — I10: ICD-10-CM

## 2020-07-14 DIAGNOSIS — Z51.81: Primary | ICD-10-CM

## 2020-08-11 ENCOUNTER — HOSPITAL ENCOUNTER (OUTPATIENT)
Dept: HOSPITAL 35 - SJCVC | Age: 75
End: 2020-08-11
Attending: INTERNAL MEDICINE
Payer: COMMERCIAL

## 2020-08-11 DIAGNOSIS — I48.91: ICD-10-CM

## 2020-08-11 DIAGNOSIS — Z95.0: ICD-10-CM

## 2020-08-11 DIAGNOSIS — E11.9: ICD-10-CM

## 2020-08-11 DIAGNOSIS — I10: ICD-10-CM

## 2020-08-11 DIAGNOSIS — Z51.81: Primary | ICD-10-CM

## 2020-08-11 DIAGNOSIS — Z79.899: ICD-10-CM

## 2020-08-11 DIAGNOSIS — E78.00: ICD-10-CM

## 2020-08-11 DIAGNOSIS — Z79.01: ICD-10-CM

## 2020-08-27 ENCOUNTER — HOSPITAL ENCOUNTER (OUTPATIENT)
Dept: HOSPITAL 35 - SJCVC | Age: 75
End: 2020-08-27
Attending: INTERNAL MEDICINE
Payer: COMMERCIAL

## 2020-08-27 DIAGNOSIS — Z79.899: ICD-10-CM

## 2020-08-27 DIAGNOSIS — R94.31: ICD-10-CM

## 2020-08-27 DIAGNOSIS — I48.91: ICD-10-CM

## 2020-08-27 DIAGNOSIS — I48.19: ICD-10-CM

## 2020-08-27 DIAGNOSIS — Z87.891: ICD-10-CM

## 2020-08-27 DIAGNOSIS — Z45.018: Primary | ICD-10-CM

## 2020-08-27 DIAGNOSIS — I10: ICD-10-CM

## 2020-09-08 ENCOUNTER — HOSPITAL ENCOUNTER (OUTPATIENT)
Dept: HOSPITAL 35 - SJCVC | Age: 75
End: 2020-09-08
Attending: INTERNAL MEDICINE
Payer: COMMERCIAL

## 2020-09-08 DIAGNOSIS — Z51.81: Primary | ICD-10-CM

## 2020-09-08 DIAGNOSIS — Z79.01: ICD-10-CM

## 2020-09-08 DIAGNOSIS — Z79.899: ICD-10-CM

## 2020-09-08 DIAGNOSIS — I10: ICD-10-CM

## 2020-09-08 DIAGNOSIS — Z95.0: ICD-10-CM

## 2020-09-08 DIAGNOSIS — I48.91: ICD-10-CM

## 2020-09-08 DIAGNOSIS — E78.00: ICD-10-CM

## 2020-09-08 DIAGNOSIS — E11.9: ICD-10-CM

## 2020-10-06 ENCOUNTER — HOSPITAL ENCOUNTER (OUTPATIENT)
Dept: HOSPITAL 35 - SJCVC | Age: 75
End: 2020-10-06
Attending: INTERNAL MEDICINE
Payer: COMMERCIAL

## 2020-10-06 DIAGNOSIS — Z79.899: ICD-10-CM

## 2020-10-06 DIAGNOSIS — E11.9: ICD-10-CM

## 2020-10-06 DIAGNOSIS — E78.00: ICD-10-CM

## 2020-10-06 DIAGNOSIS — Z79.01: ICD-10-CM

## 2020-10-06 DIAGNOSIS — Z95.0: ICD-10-CM

## 2020-10-06 DIAGNOSIS — I10: ICD-10-CM

## 2020-10-06 DIAGNOSIS — I48.91: ICD-10-CM

## 2020-10-06 DIAGNOSIS — Z51.81: Primary | ICD-10-CM

## 2020-10-13 ENCOUNTER — HOSPITAL ENCOUNTER (OUTPATIENT)
Dept: HOSPITAL 35 - SJCVC | Age: 75
End: 2020-10-13
Attending: INTERNAL MEDICINE
Payer: COMMERCIAL

## 2020-10-13 DIAGNOSIS — Z51.81: Primary | ICD-10-CM

## 2020-10-13 DIAGNOSIS — Z79.01: ICD-10-CM

## 2020-10-13 DIAGNOSIS — I48.91: ICD-10-CM

## 2020-11-10 ENCOUNTER — HOSPITAL ENCOUNTER (OUTPATIENT)
Dept: HOSPITAL 35 - SJCVC | Age: 75
End: 2020-11-10
Attending: INTERNAL MEDICINE
Payer: COMMERCIAL

## 2020-11-10 DIAGNOSIS — Z95.0: ICD-10-CM

## 2020-11-10 DIAGNOSIS — Z79.899: ICD-10-CM

## 2020-11-10 DIAGNOSIS — I10: ICD-10-CM

## 2020-11-10 DIAGNOSIS — E11.9: ICD-10-CM

## 2020-11-10 DIAGNOSIS — I48.91: ICD-10-CM

## 2020-11-10 DIAGNOSIS — Z51.81: Primary | ICD-10-CM

## 2020-11-10 DIAGNOSIS — Z79.01: ICD-10-CM

## 2020-11-10 DIAGNOSIS — E78.00: ICD-10-CM

## 2020-12-08 ENCOUNTER — HOSPITAL ENCOUNTER (OUTPATIENT)
Dept: HOSPITAL 35 - SJCVC | Age: 75
End: 2020-12-08
Attending: INTERNAL MEDICINE
Payer: COMMERCIAL

## 2020-12-08 DIAGNOSIS — I48.91: ICD-10-CM

## 2020-12-08 DIAGNOSIS — I10: ICD-10-CM

## 2020-12-08 DIAGNOSIS — Z79.01: ICD-10-CM

## 2020-12-08 DIAGNOSIS — Z79.899: ICD-10-CM

## 2020-12-08 DIAGNOSIS — E11.9: ICD-10-CM

## 2020-12-08 DIAGNOSIS — E78.00: ICD-10-CM

## 2020-12-08 DIAGNOSIS — Z95.0: ICD-10-CM

## 2020-12-08 DIAGNOSIS — Z51.81: Primary | ICD-10-CM

## 2020-12-15 ENCOUNTER — HOSPITAL ENCOUNTER (OUTPATIENT)
Dept: HOSPITAL 35 - SJCVC | Age: 75
End: 2020-12-15
Attending: INTERNAL MEDICINE
Payer: COMMERCIAL

## 2020-12-15 DIAGNOSIS — E11.9: ICD-10-CM

## 2020-12-15 DIAGNOSIS — I10: ICD-10-CM

## 2020-12-15 DIAGNOSIS — Z51.81: Primary | ICD-10-CM

## 2020-12-15 DIAGNOSIS — E78.00: ICD-10-CM

## 2020-12-15 DIAGNOSIS — Z79.899: ICD-10-CM

## 2020-12-15 DIAGNOSIS — I48.91: ICD-10-CM

## 2020-12-15 DIAGNOSIS — Z95.0: ICD-10-CM

## 2020-12-15 DIAGNOSIS — Z79.01: ICD-10-CM

## 2020-12-22 ENCOUNTER — HOSPITAL ENCOUNTER (OUTPATIENT)
Dept: HOSPITAL 35 - RAD | Age: 75
End: 2020-12-22
Attending: INTERNAL MEDICINE
Payer: COMMERCIAL

## 2020-12-22 ENCOUNTER — HOSPITAL ENCOUNTER (OUTPATIENT)
Dept: HOSPITAL 35 - SJCVC | Age: 75
End: 2020-12-22
Attending: INTERNAL MEDICINE
Payer: COMMERCIAL

## 2020-12-22 DIAGNOSIS — S09.90XA: Primary | ICD-10-CM

## 2020-12-22 DIAGNOSIS — E11.9: ICD-10-CM
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
## 2020-12-22 DIAGNOSIS — I48.91: ICD-10-CM
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
## 2020-12-22 DIAGNOSIS — Z51.81: Primary | ICD-10-CM

## 2020-12-22 DIAGNOSIS — I10: ICD-10-CM

## 2020-12-22 DIAGNOSIS — Z79.01: ICD-10-CM

## 2020-12-22 DIAGNOSIS — E78.00: ICD-10-CM
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
## 2020-12-22 DIAGNOSIS — E78.5: ICD-10-CM

## 2020-12-30 ENCOUNTER — HOSPITAL ENCOUNTER (OUTPATIENT)
Dept: HOSPITAL 35 - SJCVC | Age: 75
End: 2020-12-30
Attending: INTERNAL MEDICINE
Payer: COMMERCIAL

## 2020-12-30 DIAGNOSIS — Z79.01: ICD-10-CM

## 2020-12-30 DIAGNOSIS — I10: ICD-10-CM

## 2020-12-30 DIAGNOSIS — Z51.81: Primary | ICD-10-CM

## 2020-12-30 DIAGNOSIS — Z79.899: ICD-10-CM

## 2020-12-30 DIAGNOSIS — Z95.0: ICD-10-CM

## 2020-12-30 DIAGNOSIS — E11.9: ICD-10-CM

## 2020-12-30 DIAGNOSIS — I48.91: ICD-10-CM

## 2020-12-30 DIAGNOSIS — E78.00: ICD-10-CM

## 2021-01-13 ENCOUNTER — HOSPITAL ENCOUNTER (OUTPATIENT)
Dept: HOSPITAL 35 - SJCVC | Age: 76
End: 2021-01-13
Attending: INTERNAL MEDICINE
Payer: COMMERCIAL

## 2021-01-13 DIAGNOSIS — Z79.01: ICD-10-CM

## 2021-01-13 DIAGNOSIS — Z51.81: Primary | ICD-10-CM

## 2021-01-13 DIAGNOSIS — I48.91: ICD-10-CM

## 2021-02-10 ENCOUNTER — HOSPITAL ENCOUNTER (OUTPATIENT)
Dept: HOSPITAL 35 - SJCVC | Age: 76
End: 2021-02-10
Attending: INTERNAL MEDICINE
Payer: COMMERCIAL

## 2021-02-10 DIAGNOSIS — Z95.0: ICD-10-CM

## 2021-02-10 DIAGNOSIS — Z51.81: Primary | ICD-10-CM

## 2021-02-10 DIAGNOSIS — Z79.01: ICD-10-CM

## 2021-02-10 DIAGNOSIS — E11.9: ICD-10-CM

## 2021-02-10 DIAGNOSIS — E78.5: ICD-10-CM

## 2021-02-10 DIAGNOSIS — I10: ICD-10-CM

## 2021-02-10 DIAGNOSIS — Z79.899: ICD-10-CM

## 2021-02-10 DIAGNOSIS — E78.00: ICD-10-CM

## 2021-02-10 DIAGNOSIS — I48.91: ICD-10-CM

## 2021-03-02 ENCOUNTER — HOSPITAL ENCOUNTER (OUTPATIENT)
Dept: HOSPITAL 35 - SJCVCIMAG | Age: 76
End: 2021-03-02
Attending: INTERNAL MEDICINE
Payer: COMMERCIAL

## 2021-03-02 DIAGNOSIS — Z82.49: ICD-10-CM

## 2021-03-02 DIAGNOSIS — I25.10: ICD-10-CM

## 2021-03-02 DIAGNOSIS — I48.91: ICD-10-CM

## 2021-03-02 DIAGNOSIS — I35.2: Primary | ICD-10-CM

## 2021-03-02 DIAGNOSIS — E78.5: ICD-10-CM

## 2021-03-02 DIAGNOSIS — E78.00: ICD-10-CM

## 2021-03-02 DIAGNOSIS — Z88.8: ICD-10-CM

## 2021-03-02 DIAGNOSIS — Z79.84: ICD-10-CM

## 2021-03-02 DIAGNOSIS — E11.9: ICD-10-CM

## 2021-03-02 DIAGNOSIS — Z87.891: ICD-10-CM

## 2021-03-02 DIAGNOSIS — Z90.49: ICD-10-CM

## 2021-03-02 DIAGNOSIS — I11.9: ICD-10-CM

## 2021-03-02 DIAGNOSIS — Z79.899: ICD-10-CM

## 2021-03-02 DIAGNOSIS — Z79.01: ICD-10-CM

## 2021-03-02 DIAGNOSIS — R60.9: ICD-10-CM

## 2021-03-02 DIAGNOSIS — Z98.890: ICD-10-CM

## 2021-03-02 DIAGNOSIS — Z95.0: ICD-10-CM

## 2021-03-10 ENCOUNTER — HOSPITAL ENCOUNTER (OUTPATIENT)
Dept: HOSPITAL 35 - SJCVC | Age: 76
End: 2021-03-10
Attending: INTERNAL MEDICINE
Payer: COMMERCIAL

## 2021-03-10 DIAGNOSIS — I48.91: ICD-10-CM

## 2021-03-10 DIAGNOSIS — Z79.01: ICD-10-CM

## 2021-03-10 DIAGNOSIS — E78.5: ICD-10-CM

## 2021-03-10 DIAGNOSIS — I10: ICD-10-CM

## 2021-03-10 DIAGNOSIS — E78.00: ICD-10-CM

## 2021-03-10 DIAGNOSIS — Z79.84: ICD-10-CM

## 2021-03-10 DIAGNOSIS — Z51.81: Primary | ICD-10-CM

## 2021-03-10 DIAGNOSIS — E11.9: ICD-10-CM

## 2021-03-10 DIAGNOSIS — Z95.0: ICD-10-CM

## 2021-03-10 DIAGNOSIS — Z79.899: ICD-10-CM

## 2021-04-07 ENCOUNTER — HOSPITAL ENCOUNTER (OUTPATIENT)
Dept: HOSPITAL 35 - SJCVC | Age: 76
End: 2021-04-07
Attending: INTERNAL MEDICINE
Payer: COMMERCIAL

## 2021-04-07 DIAGNOSIS — I10: ICD-10-CM

## 2021-04-07 DIAGNOSIS — E11.9: ICD-10-CM

## 2021-04-07 DIAGNOSIS — Z79.84: ICD-10-CM

## 2021-04-07 DIAGNOSIS — Z95.0: ICD-10-CM

## 2021-04-07 DIAGNOSIS — E78.00: ICD-10-CM

## 2021-04-07 DIAGNOSIS — Z51.81: Primary | ICD-10-CM

## 2021-04-07 DIAGNOSIS — Z79.899: ICD-10-CM

## 2021-04-07 DIAGNOSIS — E78.5: ICD-10-CM

## 2021-04-07 DIAGNOSIS — I48.91: ICD-10-CM

## 2021-04-07 DIAGNOSIS — Z79.01: ICD-10-CM

## 2021-04-07 DIAGNOSIS — E66.09: ICD-10-CM

## 2021-05-05 ENCOUNTER — HOSPITAL ENCOUNTER (OUTPATIENT)
Dept: HOSPITAL 35 - SJCVC | Age: 76
End: 2021-05-05
Attending: INTERNAL MEDICINE
Payer: COMMERCIAL

## 2021-05-05 DIAGNOSIS — I10: ICD-10-CM

## 2021-05-05 DIAGNOSIS — Z51.81: Primary | ICD-10-CM

## 2021-05-05 DIAGNOSIS — E11.9: ICD-10-CM

## 2021-05-05 DIAGNOSIS — E78.00: ICD-10-CM

## 2021-05-05 DIAGNOSIS — E78.5: ICD-10-CM

## 2021-05-05 DIAGNOSIS — E66.9: ICD-10-CM

## 2021-05-05 DIAGNOSIS — Z79.899: ICD-10-CM

## 2021-05-05 DIAGNOSIS — I48.91: ICD-10-CM

## 2021-05-05 DIAGNOSIS — Z79.01: ICD-10-CM

## 2021-05-05 DIAGNOSIS — Z95.0: ICD-10-CM

## 2021-05-12 ENCOUNTER — HOSPITAL ENCOUNTER (OUTPATIENT)
Dept: HOSPITAL 35 - SJCVC | Age: 76
End: 2021-05-12
Attending: INTERNAL MEDICINE
Payer: COMMERCIAL

## 2021-05-12 DIAGNOSIS — E11.9: ICD-10-CM

## 2021-05-12 DIAGNOSIS — Z79.01: ICD-10-CM

## 2021-05-12 DIAGNOSIS — I48.91: ICD-10-CM

## 2021-05-12 DIAGNOSIS — Z51.81: Primary | ICD-10-CM

## 2021-05-12 DIAGNOSIS — I10: ICD-10-CM

## 2021-05-12 DIAGNOSIS — E66.01: ICD-10-CM

## 2021-05-12 DIAGNOSIS — E78.5: ICD-10-CM

## 2021-05-12 DIAGNOSIS — R00.1: ICD-10-CM

## 2021-05-12 DIAGNOSIS — Z95.0: ICD-10-CM

## 2021-05-12 DIAGNOSIS — E78.00: ICD-10-CM

## 2021-05-19 ENCOUNTER — HOSPITAL ENCOUNTER (OUTPATIENT)
Dept: HOSPITAL 35 - SJCVC | Age: 76
End: 2021-05-19
Attending: INTERNAL MEDICINE
Payer: COMMERCIAL

## 2021-05-19 DIAGNOSIS — I10: ICD-10-CM

## 2021-05-19 DIAGNOSIS — E78.5: ICD-10-CM

## 2021-05-19 DIAGNOSIS — Z51.81: Primary | ICD-10-CM

## 2021-05-19 DIAGNOSIS — Z95.0: ICD-10-CM

## 2021-05-19 DIAGNOSIS — E78.00: ICD-10-CM

## 2021-05-19 DIAGNOSIS — I48.91: ICD-10-CM

## 2021-05-19 DIAGNOSIS — E11.9: ICD-10-CM

## 2021-05-19 DIAGNOSIS — Z79.01: ICD-10-CM

## 2021-06-02 ENCOUNTER — HOSPITAL ENCOUNTER (OUTPATIENT)
Dept: HOSPITAL 35 - SJCVC | Age: 76
End: 2021-06-02
Attending: INTERNAL MEDICINE
Payer: COMMERCIAL

## 2021-06-02 DIAGNOSIS — Z51.81: Primary | ICD-10-CM

## 2021-06-02 DIAGNOSIS — Z79.01: ICD-10-CM

## 2021-06-09 ENCOUNTER — HOSPITAL ENCOUNTER (OUTPATIENT)
Dept: HOSPITAL 35 - SJCVC | Age: 76
End: 2021-06-09
Attending: INTERNAL MEDICINE
Payer: COMMERCIAL

## 2021-06-09 DIAGNOSIS — Z79.84: ICD-10-CM

## 2021-06-09 DIAGNOSIS — E11.40: ICD-10-CM

## 2021-06-09 DIAGNOSIS — Z85.51: ICD-10-CM

## 2021-06-09 DIAGNOSIS — Z95.818: ICD-10-CM

## 2021-06-09 DIAGNOSIS — Z79.899: ICD-10-CM

## 2021-06-09 DIAGNOSIS — E78.5: ICD-10-CM

## 2021-06-09 DIAGNOSIS — I10: ICD-10-CM

## 2021-06-09 DIAGNOSIS — Z87.891: ICD-10-CM

## 2021-06-09 DIAGNOSIS — Z88.8: ICD-10-CM

## 2021-06-09 DIAGNOSIS — E11.9: ICD-10-CM

## 2021-06-09 DIAGNOSIS — Z51.81: Primary | ICD-10-CM

## 2021-06-09 DIAGNOSIS — Z88.4: ICD-10-CM

## 2021-06-09 DIAGNOSIS — Z72.89: ICD-10-CM

## 2021-06-09 DIAGNOSIS — Z79.01: ICD-10-CM

## 2021-06-23 ENCOUNTER — HOSPITAL ENCOUNTER (OUTPATIENT)
Dept: HOSPITAL 35 - SJCVC | Age: 76
End: 2021-06-23
Attending: INTERNAL MEDICINE
Payer: COMMERCIAL

## 2021-06-23 DIAGNOSIS — Z79.01: ICD-10-CM

## 2021-06-23 DIAGNOSIS — Z87.891: ICD-10-CM

## 2021-06-23 DIAGNOSIS — J18.9: ICD-10-CM

## 2021-06-23 DIAGNOSIS — Z88.8: ICD-10-CM

## 2021-06-23 DIAGNOSIS — I48.0: ICD-10-CM

## 2021-06-23 DIAGNOSIS — G62.9: ICD-10-CM

## 2021-06-23 DIAGNOSIS — Z85.51: ICD-10-CM

## 2021-06-23 DIAGNOSIS — Z72.89: ICD-10-CM

## 2021-06-23 DIAGNOSIS — E11.40: ICD-10-CM

## 2021-06-23 DIAGNOSIS — Z88.4: ICD-10-CM

## 2021-06-23 DIAGNOSIS — Z79.899: ICD-10-CM

## 2021-06-23 DIAGNOSIS — E78.5: ICD-10-CM

## 2021-06-23 DIAGNOSIS — Z51.81: Primary | ICD-10-CM

## 2021-06-23 DIAGNOSIS — E11.9: ICD-10-CM

## 2021-07-21 ENCOUNTER — HOSPITAL ENCOUNTER (OUTPATIENT)
Dept: HOSPITAL 35 - SJCVC | Age: 76
End: 2021-07-21
Attending: INTERNAL MEDICINE
Payer: COMMERCIAL

## 2021-07-21 DIAGNOSIS — E78.00: ICD-10-CM

## 2021-07-21 DIAGNOSIS — E11.9: ICD-10-CM

## 2021-07-21 DIAGNOSIS — Z95.0: ICD-10-CM

## 2021-07-21 DIAGNOSIS — I48.91: ICD-10-CM

## 2021-07-21 DIAGNOSIS — Z79.82: ICD-10-CM

## 2021-07-21 DIAGNOSIS — Z51.81: Primary | ICD-10-CM

## 2021-07-21 DIAGNOSIS — E66.9: ICD-10-CM

## 2021-07-21 DIAGNOSIS — Z79.899: ICD-10-CM

## 2021-07-21 DIAGNOSIS — Z79.01: ICD-10-CM

## 2021-07-21 DIAGNOSIS — E78.5: ICD-10-CM

## 2021-07-21 DIAGNOSIS — I10: ICD-10-CM

## 2021-07-28 ENCOUNTER — HOSPITAL ENCOUNTER (OUTPATIENT)
Dept: HOSPITAL 35 - SJCVC | Age: 76
End: 2021-07-28
Attending: INTERNAL MEDICINE
Payer: COMMERCIAL

## 2021-07-28 DIAGNOSIS — E11.9: ICD-10-CM

## 2021-07-28 DIAGNOSIS — E78.5: ICD-10-CM

## 2021-07-28 DIAGNOSIS — Z79.01: ICD-10-CM

## 2021-07-28 DIAGNOSIS — I48.91: ICD-10-CM

## 2021-07-28 DIAGNOSIS — Z51.81: Primary | ICD-10-CM

## 2021-07-28 DIAGNOSIS — Z79.899: ICD-10-CM

## 2021-07-28 DIAGNOSIS — E78.00: ICD-10-CM

## 2021-07-28 DIAGNOSIS — E66.9: ICD-10-CM

## 2021-07-28 DIAGNOSIS — I10: ICD-10-CM

## 2021-08-11 ENCOUNTER — HOSPITAL ENCOUNTER (OUTPATIENT)
Dept: HOSPITAL 35 - SJCVC | Age: 76
End: 2021-08-11
Attending: INTERNAL MEDICINE
Payer: COMMERCIAL

## 2021-08-11 DIAGNOSIS — Z51.81: Primary | ICD-10-CM

## 2021-08-11 DIAGNOSIS — I10: ICD-10-CM

## 2021-08-11 DIAGNOSIS — Z79.01: ICD-10-CM

## 2021-08-11 DIAGNOSIS — Z95.0: ICD-10-CM

## 2021-08-11 DIAGNOSIS — E11.9: ICD-10-CM

## 2021-08-11 DIAGNOSIS — Z79.84: ICD-10-CM

## 2021-08-11 DIAGNOSIS — I48.91: ICD-10-CM

## 2021-08-11 DIAGNOSIS — Z79.899: ICD-10-CM

## 2021-08-11 DIAGNOSIS — E66.9: ICD-10-CM

## 2021-08-11 DIAGNOSIS — E78.5: ICD-10-CM

## 2021-09-02 ENCOUNTER — HOSPITAL ENCOUNTER (OUTPATIENT)
Dept: HOSPITAL 35 - SJCVC | Age: 76
End: 2021-09-02
Attending: INTERNAL MEDICINE
Payer: COMMERCIAL

## 2021-09-02 DIAGNOSIS — I10: ICD-10-CM

## 2021-09-02 DIAGNOSIS — I48.91: ICD-10-CM

## 2021-09-02 DIAGNOSIS — E11.40: ICD-10-CM

## 2021-09-02 DIAGNOSIS — Z88.8: ICD-10-CM

## 2021-09-02 DIAGNOSIS — Z87.891: ICD-10-CM

## 2021-09-02 DIAGNOSIS — R60.9: ICD-10-CM

## 2021-09-02 DIAGNOSIS — Z72.89: ICD-10-CM

## 2021-09-02 DIAGNOSIS — R94.31: Primary | ICD-10-CM

## 2021-09-02 DIAGNOSIS — Z79.899: ICD-10-CM

## 2021-09-02 DIAGNOSIS — E78.00: ICD-10-CM

## 2021-09-02 DIAGNOSIS — Z79.01: ICD-10-CM

## 2021-09-02 DIAGNOSIS — Z95.0: ICD-10-CM

## 2021-10-07 ENCOUNTER — HOSPITAL ENCOUNTER (OUTPATIENT)
Dept: HOSPITAL 35 - SJCVC | Age: 76
End: 2021-10-07
Attending: INTERNAL MEDICINE
Payer: COMMERCIAL

## 2021-10-07 DIAGNOSIS — Z79.899: ICD-10-CM

## 2021-10-07 DIAGNOSIS — E66.09: ICD-10-CM

## 2021-10-07 DIAGNOSIS — Z95.0: ICD-10-CM

## 2021-10-07 DIAGNOSIS — E78.00: ICD-10-CM

## 2021-10-07 DIAGNOSIS — E11.9: ICD-10-CM

## 2021-10-07 DIAGNOSIS — Z79.01: ICD-10-CM

## 2021-10-07 DIAGNOSIS — Z51.81: Primary | ICD-10-CM

## 2021-10-07 DIAGNOSIS — I48.91: ICD-10-CM

## 2021-10-07 DIAGNOSIS — R53.83: ICD-10-CM

## 2021-10-07 DIAGNOSIS — E78.5: ICD-10-CM

## 2021-10-07 DIAGNOSIS — Z79.84: ICD-10-CM

## 2021-10-07 DIAGNOSIS — I10: ICD-10-CM

## 2021-11-04 ENCOUNTER — HOSPITAL ENCOUNTER (OUTPATIENT)
Dept: HOSPITAL 35 - SJCVC | Age: 76
End: 2021-11-04
Attending: INTERNAL MEDICINE
Payer: COMMERCIAL

## 2021-11-04 DIAGNOSIS — Z51.81: Primary | ICD-10-CM

## 2021-11-04 DIAGNOSIS — I48.91: ICD-10-CM

## 2021-11-04 DIAGNOSIS — Z79.01: ICD-10-CM

## 2021-12-02 ENCOUNTER — HOSPITAL ENCOUNTER (OUTPATIENT)
Dept: HOSPITAL 35 - SJCVC | Age: 76
End: 2021-12-02
Attending: INTERNAL MEDICINE
Payer: COMMERCIAL

## 2021-12-02 DIAGNOSIS — Z79.01: ICD-10-CM

## 2021-12-02 DIAGNOSIS — Z79.84: ICD-10-CM

## 2021-12-02 DIAGNOSIS — Z72.89: ICD-10-CM

## 2021-12-02 DIAGNOSIS — Z79.899: ICD-10-CM

## 2021-12-02 DIAGNOSIS — I48.91: Primary | ICD-10-CM

## 2021-12-02 DIAGNOSIS — Z87.891: ICD-10-CM

## 2021-12-06 ENCOUNTER — HOSPITAL ENCOUNTER (OUTPATIENT)
Dept: HOSPITAL 35 - SJCVC | Age: 76
End: 2021-12-06
Attending: INTERNAL MEDICINE
Payer: COMMERCIAL

## 2021-12-06 DIAGNOSIS — I48.91: Primary | ICD-10-CM

## 2021-12-06 DIAGNOSIS — Z79.01: ICD-10-CM

## 2021-12-06 DIAGNOSIS — Z79.84: ICD-10-CM

## 2021-12-22 ENCOUNTER — HOSPITAL ENCOUNTER (OUTPATIENT)
Dept: HOSPITAL 35 - SJCVC | Age: 76
End: 2021-12-22
Attending: INTERNAL MEDICINE
Payer: COMMERCIAL

## 2021-12-22 DIAGNOSIS — E78.5: ICD-10-CM

## 2021-12-22 DIAGNOSIS — Z79.84: ICD-10-CM

## 2021-12-22 DIAGNOSIS — Z88.8: ICD-10-CM

## 2021-12-22 DIAGNOSIS — Z79.899: ICD-10-CM

## 2021-12-22 DIAGNOSIS — Z82.49: ICD-10-CM

## 2021-12-22 DIAGNOSIS — Z51.81: Primary | ICD-10-CM

## 2021-12-22 DIAGNOSIS — Z79.01: ICD-10-CM

## 2021-12-22 DIAGNOSIS — I10: ICD-10-CM

## 2021-12-22 DIAGNOSIS — E11.9: ICD-10-CM

## 2021-12-22 DIAGNOSIS — Z87.891: ICD-10-CM

## 2021-12-22 DIAGNOSIS — Z72.89: ICD-10-CM

## 2021-12-29 ENCOUNTER — HOSPITAL ENCOUNTER (OUTPATIENT)
Dept: HOSPITAL 35 - SJCVC | Age: 76
End: 2021-12-29
Attending: INTERNAL MEDICINE
Payer: COMMERCIAL

## 2021-12-29 DIAGNOSIS — Z79.84: ICD-10-CM

## 2021-12-29 DIAGNOSIS — I10: ICD-10-CM

## 2021-12-29 DIAGNOSIS — E11.9: ICD-10-CM

## 2021-12-29 DIAGNOSIS — E78.00: ICD-10-CM

## 2021-12-29 DIAGNOSIS — Z51.81: Primary | ICD-10-CM

## 2021-12-29 DIAGNOSIS — Z79.01: ICD-10-CM

## 2021-12-29 DIAGNOSIS — Z79.899: ICD-10-CM

## 2021-12-29 DIAGNOSIS — E78.5: ICD-10-CM

## 2022-01-05 ENCOUNTER — HOSPITAL ENCOUNTER (OUTPATIENT)
Dept: HOSPITAL 35 - SJCVC | Age: 77
End: 2022-01-05
Attending: INTERNAL MEDICINE
Payer: COMMERCIAL

## 2022-01-05 DIAGNOSIS — E11.9: ICD-10-CM

## 2022-01-05 DIAGNOSIS — Z82.49: ICD-10-CM

## 2022-01-05 DIAGNOSIS — Z95.0: ICD-10-CM

## 2022-01-05 DIAGNOSIS — Z72.89: ICD-10-CM

## 2022-01-05 DIAGNOSIS — Z51.81: Primary | ICD-10-CM

## 2022-01-05 DIAGNOSIS — Z79.84: ICD-10-CM

## 2022-01-05 DIAGNOSIS — E78.5: ICD-10-CM

## 2022-01-05 DIAGNOSIS — I10: ICD-10-CM

## 2022-01-05 DIAGNOSIS — Z88.8: ICD-10-CM

## 2022-01-05 DIAGNOSIS — Z79.01: ICD-10-CM

## 2022-01-05 DIAGNOSIS — E78.00: ICD-10-CM

## 2022-01-05 DIAGNOSIS — Z87.891: ICD-10-CM

## 2022-01-18 ENCOUNTER — HOSPITAL ENCOUNTER (OUTPATIENT)
Dept: HOSPITAL 35 - SJCVC | Age: 77
End: 2022-01-18
Attending: INTERNAL MEDICINE
Payer: COMMERCIAL

## 2022-01-18 DIAGNOSIS — E78.5: ICD-10-CM

## 2022-01-18 DIAGNOSIS — Z79.899: ICD-10-CM

## 2022-01-18 DIAGNOSIS — E78.00: ICD-10-CM

## 2022-01-18 DIAGNOSIS — E11.9: ICD-10-CM

## 2022-01-18 DIAGNOSIS — Z79.84: ICD-10-CM

## 2022-01-18 DIAGNOSIS — I10: ICD-10-CM

## 2022-01-18 DIAGNOSIS — Z79.01: ICD-10-CM

## 2022-01-18 DIAGNOSIS — Z51.81: Primary | ICD-10-CM
